# Patient Record
Sex: FEMALE | Race: WHITE
[De-identification: names, ages, dates, MRNs, and addresses within clinical notes are randomized per-mention and may not be internally consistent; named-entity substitution may affect disease eponyms.]

---

## 2018-05-20 ENCOUNTER — HOSPITAL ENCOUNTER (EMERGENCY)
Dept: HOSPITAL 25 - UCEAST | Age: 38
Discharge: HOME | End: 2018-05-20
Payer: COMMERCIAL

## 2018-05-20 VITALS — SYSTOLIC BLOOD PRESSURE: 106 MMHG | DIASTOLIC BLOOD PRESSURE: 63 MMHG

## 2018-05-20 DIAGNOSIS — H92.09: ICD-10-CM

## 2018-05-20 DIAGNOSIS — R68.83: ICD-10-CM

## 2018-05-20 DIAGNOSIS — J02.9: Primary | ICD-10-CM

## 2018-05-20 DIAGNOSIS — R06.02: ICD-10-CM

## 2018-05-20 DIAGNOSIS — F41.9: ICD-10-CM

## 2018-05-20 DIAGNOSIS — R01.1: ICD-10-CM

## 2018-05-20 DIAGNOSIS — F17.210: ICD-10-CM

## 2018-05-20 DIAGNOSIS — Z88.5: ICD-10-CM

## 2018-05-20 DIAGNOSIS — R11.0: ICD-10-CM

## 2018-05-20 PROCEDURE — G0463 HOSPITAL OUTPT CLINIC VISIT: HCPCS

## 2018-05-20 PROCEDURE — 87651 STREP A DNA AMP PROBE: CPT

## 2018-05-20 PROCEDURE — 86308 HETEROPHILE ANTIBODY SCREEN: CPT

## 2018-05-20 PROCEDURE — 99212 OFFICE O/P EST SF 10 MIN: CPT

## 2018-05-20 PROCEDURE — 36415 COLL VENOUS BLD VENIPUNCTURE: CPT

## 2018-05-20 NOTE — XMS REPORT
Valerie Gracia

 Created on:May 11, 2018



Patient:Valerie Gracia

Sex:Female

:1980

External Reference #:[id number]





Demographics







 Address  [address]



   [place] [address]

 

 Mobile Phone  [phone]

 

 Work Phone  [phone]

 

 Email Address  [email or web address]

 

 Preferred Language  English

 

 Marital Status  Not  Or 

 

 Muslim Affiliation  Unknown

 

 Race  White

 

 Ethnic Group  Not  Or 









Author







 Organization  [facility]

 

 Address  [address] [place] [address]

 

 Phone  [phone]









Care Team Providers







 Name  Role  Phone

 

 MeetRyan NP  Primary Care Physician  Unavailable









Payers







 Type  Date  Identification Numbers  Payment Provider  Subscriber

 

 Commercial  Effective:  Policy Number: [id number]  Leamalia Orellana RONALD [place]



   2012    Healthcare-Man  



       Med  









 Expires: 2016  Group Name: [employer]  [address]









 PayID: [id number]  [place] [address]









 Medigap Part B  Effective:  Policy Number:  Luis A BCFELIX CARDENAS



   10/01/2016  [id number]    [place]









 Expires: 2017  Group Name: Essential [employer] 1  [address] [address]









 PayID: [id number]  LEELEE Mcginnis [address]









 Medigap Part B  Effective:  Policy Number:  Medicaid/Computer  Valerie CARDENAS



   2017  [id number]  Science  [place]









 Expires: 2017  Group Name: 1 1  [address]









 PayID: [id number]  [place] [address]









 Commercial  Effective:  Policy Number:  [employer]  Valerie CARDENAS



   2017  [id number]  Med  [place]









 Expires: 2018  PayID: [id number]  [address]









 [place] [address]









 Commercial  Effective:  Policy Number:  [employer]  Valerie CARDENAS



   2018  [id number]  Medicaid  [place]









 PayID: [id number]  [address]









 [place] [address]







Advance Directives







 Type  Date  Description  Status  Comment

 

 Other Directive  2014  Health Care Proxy  Current and Verified  







Problems







 Date  Description  Provider  Status

 

 Onset: 01/15/2018  Chronic back pain  Ryan Dsouza, FNP-C  Active

 

 Onset: 2012  Alcohol dependence  Lissy Lr M.D.  Resolved









 Resolved: 01/15/2018









 Onset: 2012  Insomnia  Lissy Lr M.D.  Resolved









 Resolved: 01/15/2018









 Onset: 2012  Depressive disorder  Lissy Lr M.D.  Resolved









 Resolved: 01/15/2018







Family History







 Date  Family Member(s)  Problem(s)  Comments

 

   Father  Healthy  

 

   Mother  Depression  

 

   Mother  Irritable Bowel Syndrome  

 

   Mother  Cancer, Cervical  

 

   Maternal Grandfather  Alcoholism  







Social History







 Type  Date  Description  Comments

 

 Marital Status    Single  

 

 Home Environment    [place]  

 

 Work Status    Currently Working  [employer]

 

 Cigarette Use    Former Cigarette Smoker  

 

 ETOH Use    Denies alcohol use  

 

 Recreational Drug Use    Former Drug User  

 

 Smoking    Patient is a former smoker  

 

 Daily Caffeine    Does Not Consume Caffeine  

 

 Exercise Type/Frequency    Walks 4 times a week  







Allergies, Adverse Reactions, Alerts







 Date  Description  Reaction  Status  Severity  Comments

 

 2016  Morphine    active    dizziness

 

 2012  NKDA    inactive    







Medications







 Medication  Date  Status  Form  Strength  Qnty  SIG  Indications  Ordering



                 Provider

 

 Zonisamide    Active  Capsules  100mg  60cap  2 by  G89.4  wnti R.



           s  mouth    Storm,



             before    FNP-C



             bed    

 

 Trazodone HCL    Active  Tablets  100mg  60tab  2 by  G47.00  nti R.



           s  mouth    Storm,



             every    FNP-C



             night at    



             bedtime    



             for sleep    

 

 Oxycodone HCL    Active  Tablets  30mg  150ta  1 by  G89.4  nt R.



           bs  mouth    Storm,



             every 4    FNP-C



             hours for    



             pain, MDD    



             6    

 

 Lidocaine-Priloca    Active  Cream  2.5-2.5%  25gm  apply pea  K64.9  
Shawnti R.



 ine            sized    Storm,



             area 3 to    FNP-C



             4 times    



             daily for    



             pain    

 

 Proctozone-HC    Active  Cream  2.5%  30gm  apply to  K64.9  Shawnti R.



   /          hemorrhoi    Storm,



             ds 3 - 4    FNP-C



             times    



             daily if    



             needed    

 

 Oxycodone HCL ER    Active  Tab ER 12H  20mg  30tab  1 by    Ryan R.



       Abuse-Det    s  mouth    Storm,



             before    FNP-C



             bed for    



             chronic    



             pain    

 

 Prednisone  03/15  Active  Tablets  20mg  13tab  3 by  S46.811A  Elizabethi R        s  mouth    Storm,



             every day    FNP-C



             x 2days,    



             2 by    



             mouth    



             every day    



             x 2 days,    



             1 by    



             mouth    



             every day    



             x 2 days,    



             1/2 by    



             mouth    



             every day    



             x 2 days    

 

 Wellbutrin SR  03/15  Active  Tablets ER  150mg  60tab  take one  R53.83  
shalinii R    12HR    s  tablet by    Storm,



             mouth    FNP-C



             twice a    



             day    

 

 Nicotine    Active  Patches  14mg/24HR  28uni  1 patch    shalinii R    24HR    ts  every    Storm,



             day,    FNP-C



             remove    



             after 24    



             hours an    



             replace    



             with a    



             new    



             patch,    



             rotate    



             sites    

 

 Apri    Active  Tablets  0.15-30mg  168ta  1 by  Z30.9  Elizabethi R      -mcg  bs  mouth    Storm,



             every    FNP-C



             day, skip    



             placebo    



             pills and    



             start a    



             new pack    

 

 Gentamicin    Active  Solution  0.3%  5ml  2 drops  H00.015  Ryan RVishnu



 Sulfate  /2017          in    [facility],



             affected    FNP-C



             eye four    



             times a    



             day for    



             one week    

 

 Zofran Odt    Active  Tablets  4mg  30tab  dissolve  G89.4  Elizabethi R    Dispers    s  one tab    Storm,



             in mouth    FNP-C



             every 6-8    



             hours as    



             needed    



             nausea    

 

 Thiamine HCL    Active  Tablets  100mg  30tab  Take One  G90.09  Ryan 
R.



           s  Tablet By    Storm,



             Mouth    FNP-C



             Every Day    

 

 Docusate Sodium    Active  Capsules  100mg                      Ciro



                 FNP-C

 

 Folic Acid    Active  Tablets  1mg  90tab  1 by    Ryan ANDERSON.



           s  mouth    Storm,



             daily    FNP-C

 

 Multivitamins  25  Active  Capsules                        Ciro



                 FNP-C

 

 3ML Luer-Eladio Tip    Active  Misc  24G X 1"  30uni  Use as    Lissy



 Syringe 24G X 1"        3 ML  ts  directed    JOSEMANUEL Lr,



             once per    M.D.



             month to    



             inject    



             Vitamin    



             B12    

 

 Cyanocobalamin    Active  Solution  1000mcg/M  4unit  inject    Ryan MAYBERRY



         L  s  one    Storm,



             millilite    FNP-C



             rs (cc)    



             intramusc    



             ularly    



             once a    



             week as    



             directed    

 

 Tizanidine HCL    Active  Tablets  2mg    take 1    Unknown



   0000          tablet by    



             mouth    



             three    



             times a    



             day    

 

                 

 

 Oxycodone HCL ER  03/15  Hx  Tab ER 12H  40mg  60tab  1 by    Ryan R.



       Abuse-Det    s  mouth    Storm,



   -          twice    FNP-C



             daily for    



             chronic    



             pain    

 

 Oxycodone HCL    Hx  Tablets  30mg  150ta  1 by  GMickie.Ros Davis R.



           bs  mouth    Storm,



   -          every 4    FNP-C



             hours           needed    



             for    



             breakthro    



             ugh pain,    



             mdd 6    

 

 Oxycodone HCL ER    Hx  Tab ER 12H  30mg  60tab  one by    Ryan MAYBERRY



       Abuse-Det    s  mouth    Storm,



   -          twice    FNP-C



   03/15          daily for    



             chronic    



             pain    

 

 Oxycodone HCL    Hx  Tablets  20mg  30tab  1/2 to 1  G89.4  Elizabethi R.



           s  by mouth    Storm,



   -          every 6    FNP-C



             hours           needed    



             for    



             breakthro    



             ugh pain    

 

 Oxycodone HCL    Hx  Tablets  30mg  90tab  1 by  G89.4  Lcnti R.



           s  mouth    Storm,



   -          every 4    FNP-C



             hours for    



             chronic    



             pain    

 

 Trazodone HCL    Hx  Tablets  50mg  60tab  take 1 or  G47.00  Ryan R.



           s  2 tablet    Storm,



   -          by mouth    FNP-C



             at    



             bedtime    



             if needed    



             for sleep    

 

 Zonisamide    Hx  Capsules  100mg  30cap  1 by  G89.4  Elizabethi R.



           s  mouth    Storm,



   -          before    FNP-C



   05/11          bed    



   /2018              

 

 Oxycodone HCL    Hx  Tablets  20mg  90tab  1 by  G89.4  Lcntginny R.



           s  mouth    Storm,



   -          every 4    FNP-C



             hours for    



             pain    

 

 Nortriptyline HCL  01/15  Hx  Capsules  25mg  30cap  1 by  G90.09  Sergeiwnti R.



           s  mouth    Storm,



   -          every    FNP-C



             night at    



             bedtime    



             for    



             neuropath    



             ic pain    

 

 Clindamycin    Hx  Cream  2%  40gm  one  N76.0  Elizabethi R.



 Phosphate  /2017          applicato    Storm,



   -          r full    FNP-C



             into    



             vaginal    



             before    



             bed for    



             one week    

 

 Nicotine  10/20  Hx  Patches  14mg/24HR  21uni  apply one    Sergeiwnti R.



       24HR    ts  patch    Storm,



   -          daily for    FNP-C



             3 weeks    



             then    



             decrease    



             to 7mg    



             patch    

 

 Nicotine  10/20  Hx  Patches  7mg/24HR  28uni  apply one    Sergeiwnti R.



       24HR    ts  patch    Storm,



   -          daily    FNP-C



                 

 

 Oxycodone HCL    Hx  Tablets  30mg  180ta  1 by  G89.4  Elizabethi R.



           bs  mouth    Storm,



   -          every 4    FNP-C



             hours           needed    



             for    



             breakthro    



             ugh pain,    



             mdd 6    

 

 Oxycodone HCL ER    Hx  Tab ER 12H  40mg  60tab  1 by  G89.4  Lcnti R.



       Abuse-Det    s  mouth    Storm,



   -          twice    FNP-C



   01/15          daily for    



             chronic    



             pain    

 

 Oxycodone HCL    Hx  Tablets  20mg  126ta  2 by  G89.4  Lcnti R.



           bs  mouth    Storm,



   -          every 4    FNP-C



             hours           needed    



             for pain    

 

 Levaquin    Hx  Tablets  750mg  7tabs  1 by  J18.9  Sergeiwnti R.



             mouth    Storm,



   -          daily for    FNP-C



             7 days    



             for    



             pneumonia    

 

 Oxycodone HCL ER    Hx  Tab ER 12H  60mg  30tab  1 by  G89.4  Elizabethi R.



       Abuse-Det    s  mouth    Storm,



   -          every    FNP-C



   06/30          morning    



   /2017          for    



             chronic    



             pain    

 

 Fluconazole    Hx  Tablets  150mg  2tabs  1 by    Ryan R.



             mouth    Storm,



   -          now, may    FNP-C



             repeat in    



             1 week if    



             sx still    



             present    

 

 Doxycycline    Hx  Capsules  100mg  20cap  1 by    Ryan MAYBERRY



 Hyclate  /        s  mouth    Storm,



   -          twice a    FNP-C



             day for    



             10 days    

 

 Azithromycin    Hx  Tablets  250mg  6tabs  2 by  J18.9  Ryan MAYBERRY



             mouth    Storm,



   -          today    FNP-C



             then 1 by    



             mouth    



             daily for    



             4 days    

 

 Oxycodone HCL    Hx  Tablets  10mg  100ta  1 or 2 po    Ryan MAYBERRY



           bs  up to 6    Storm,



   -          times    FNP-C



             daily for    



             breakthro    



             ugh pain    

 

 Oxycodone HCL    Hx  Tablets  20mg  180ta  1 by  G89.4  Ryan MAYBERRY



           bs  mouth up    Storm,



   -          to 8    FNP-C



   06/30          times    



   /2017          daily if    



             needed    



             for pain    

 

 Oxycontin    Hx  Tab ER 12H  80mg  30tab  1 by  G90.09  Ryan MAYBERRY



       Abuse-Det    s  mouth    Storm,



   -          before    FNP-C



   06/30          bed    



   /2017              

 

 Oxycodone HCL    Hx  Tablets  10mg  90tab  1 or 2 by  G89.4  Ryan MAYBERRY



           s  mouth    Storm,



   -          q4hr as    FNP-C



             needed    



             breakthro    



             ugh pain    

 

 Oxycontin    Hx  Tab ER 12H  60mg  14tab  1 by  G90.09  Ryan MAYBERRY



       Abuse-Det    s  mouth    Storm,



   -          twice a    FNP-C



             day for    



   /2016          pain    

 

 Oxycodone HCL  11/10  Hx  Tablets  20mg  180ta  1 by    Ryan MAYBERRY



           bs  mouth up    Storm,



   -          to 6    FNP-C



   12/19          times    



   /2016          daily for    



             chronic    



             pain    

 

 Oxycodone HCL  10/31  Hx  Tablets  20mg  42tab  1 by    Ryan MAYBERRY



           s  mouth up    Storm,



   -          to 6    FNP-C



   11/07          times    



   /2016          daily if    



             needed    



             for pain    

 

 Oxycodone HCL  10/24  Hx  Tablets  20mg  42tab  1 by    Ryan MAYBERRY



           s  mouth up    Storm,



   -          to 6    FNP-C



   10/31          times    



   /2016          daily if    



             needed    



             for pain    

 

 Fluconazole  10/20  Hx  Tablets  150mg  2tabs  1 by    Ryan MAYBERRY



             mouth    Storm,



   -          now, may    FNP-C



             repeat in    



   /2017          1 week if    



             sx still    



             present    

 

 Oxycodone HCL  10/20  Hx  Tablets  20mg  24tab  1 by    Ryan R.



           s  mouth up    Storm,



   -          to 6    FNP-C



   10/24          times    



   /2016          daily if    



             needed    



             for pain    

 

 Oxycodone HCL ER  10/18  Hx  Tab ER 12H  20mg  28tab  1 by    Ryan R.



       Abuse-Det    s  mouth    Storm,



   -          twice a    FNP-C



   11/10          day for    



             chronic    



             pain    

 

 Oxycodone HCL  10/18  Hx  Tablets  5mg  30tab  1 or 2 by  G90.  Ryan R.



           s  mouth    Storm,



   -          three    FNP-C



   11/10          times a    



             day as    



             needed    



             breakthro    



             ugh pain    

 

 Azithromycin  10/18  Hx  Tablets  250mg  6tabs  2 by  J18.9  Ryan R.



             mouth    Storm,



   -          today    FNP-C



   10/28          then 1 by    



             mouth    



             daily for    



             4 days    

 

 Ondansetron    Hx  Tablets  4mg  12tab  dissolve    shalini R.



       Dispers    s  1 tablet    Storm,



   -          in mouth    FNP-C



   11/13          three    



   /2017          times a    



             day as    



             needed    



             for    



             nausea    

 

 Duloxetine HCL    Hx  Caps DR  60mg  30cap  1 by  G90.09  Aayush



   /2016    Part    s  mouth    Ennice



   -          daily for    III, FNP-C



             ic pain    

 

 Fluconazole    Hx  Tablets  150mg  2tabs  1 by    Ryan R.



             mouth    Storm,



   -          now, may    FNP-C



   10/20          repeat in    



   /2016          1 week if    



             sx still    



             present    

 

 Oxycodone HCL    Hx  Tablets  20mg  180ta  1 by  G90.09  Ryan R.



           bs  mouth up    Storm,



   -          to 6    FNP-C



   10/18          times    



   /2016          daily if    



             needed    



             for pain    

 

 Oxycodone HCL    Hx  Tablets  10mg  180ta  1 tablet  G90.  Lcnti R.



           bs  every 4    Storm,



   -          hours if    FNP-C



             needed    



             for pain    

 

 Oxycodone HCL    Hx  Tablets  10mg  180ta  1 tablet  G90.09  Shawnti R.



           bs  every 4    Storm,



   -          hours if    FNP-C



             needed    



             for pain    

 

 Morphine Sulfate    Hx  Tablets ER  30mg  30tab  1 po bid  G90.  
Shawnti R.



 ER          s  for    Storm,



   -          chronic    FNP-C



             pain    



                 

 

 Apri    Hx  Tablets  0.15-30mg  168ta  1 by  Z30.9  Sergeiwnti R.



         -mcg  bs  mouth    Storm,



   -          every day    FNP-C



                 

 

 Morphine Sulfate    Hx  Tablets ER  15mg  30tab  1 po bid    Shawnti R.



 ER          s  for    Storm,



   -          chronic    FNP-C



             pain    



                 

 

 Gabapentin    Hx  Tablets  600mg    2 po tid  G90.  Sergeiwnti R.



                 Storm,



   -              FNP-C



                 

 

 Oxycontin    Hx  Tab ER 12H  10mg  60tab  1 by  G90.  Sergeiwnti R.



       Abuse-Det    s  mouth    Storm,



   -          twice a    FNP-C



             day for    



             chronic    



             pain    

 

 Nortriptyline HCL    Hx  Capsules  50mg  180ca  2 by  G47.00  wnti R.



           ps  mouth    Storm,



   -          every    FNP-C



             night at    



             bedtime    



             for pain    

 

 Hydroxyzine HCL    Hx  Tablets  10mg  30tab  take one  F43.9  Shawnti R.



           s  tablet by    Storm,



   -          mouth    FNP-C



   10/18          three    



   /2016          times a    



             day as    



             needed    



             for    



             anxiety    

 

 Oxycodone HCL    Hx  Tablets  5mg  30tab  1 or 2 by  G90.  Shawnti R.



           s  mouth    Storm,



   -          every 6    FNP-C



             hours           needed    



             for    



             breakthro    



             ugh pain    

 

 Oxycodone HCL    Hx  Tablets  10mg  120ta  1 tablet  G90.wnti R.



           bs  every 6    Storm,



   -          hours for    FNP-C



             pain    



                 

 

 Gabapentin    Hx  Capsules  300mg  270ca  3 by  G90.  Shawnti R.



           ps  mouth    Storm,



   -          three    FNP-C



             times    



             daily for    



             nerve    



             pain    

 

 Nortriptyline HCL    Hx  Capsules  25mg  30cap  1 by  G47.00  Elizabeth R.



           s  mouth    Storm,



   -          every    FNP-C



             night at    



             bedtime    



             for    



             insomnia    



             and pain    

 

 Ergocalciferol    Hx  Capsules  02495Dxfe  8caps  1 by              mouth    Storm,



   -          twice    FNP-C



             weekly    



             for one    



             month    

 

 Oxycodone HCL    Hx  Tablets  5mg  120ta  1 by    nt        bs  mouth    Storm,



   -          every 4    FNP-C



             hours if    



             needed    



             for pain    

 

 Lyrica    Hx  Capsules  150mg  60cap  take one  G90.nt        s  capsule    Storm,



   -          by mouth    FNP-C



             twice a    



             day;    



             maximum    



             daily    



             dose=2    

 

 Gabapentin    Hx  Capsules  300mg  180ca  2 by  G90.09          ps  mouth    Storm,



   -          three    FNP-C



             times    



             daily for    



             nerve    



             pain    

 

 Oxycodone HCL    Hx  Tablets  5mg  60tab  1 by            s  mouth    Storm,



   -          every 4    FNP-C



             hours           needed    



             for pain    

 

 Oxycodone HCL    Hx  Tablets  10mg  30tab  1 tablets    Ryan RVishnu



           s  every 6    Storm,



   -          hours as    FNP-C



             needed    



             for pain    

 

 Quetiapine    Hx  Tablets  25mg    1/2 tab    Jessy



 Fumarate  /2015          po tid    Ciro,



   -          prn    FNP-C



                 

 

 Xifaxan    Hx  Tablets  550mg  60tab  1 by            s  mouth    Ciro,



   -          twice a    FNP-C



             day for           days    

 

 Senna Laxative    Hx  Tablets  8.6mg                      Ciro,



   -              FNP-C



                 

 

 Miralax    Hx  Packet  3350NF      K72.91                Ciro,



   -              FNP-C



                 

 

 Fluoxetine HCL    Hx  Capsules  10mg  30cap  1 by  308.3  Ryan R        s  mouth    Storm,



   -          every    FNP-C



   11/25          morning    



   /2015              

 

 Chlordiazepoxide    Hx  Capsules  10mg  120ca  1 by  303.90  [facility] R.



 HCL  /        ps  mouth    Storm,



   -          every 6    FNP-C



             hours           needed    



             for    



             anxiety    

 

 Clonidine HCL    Hx  Tablets  0.1mg  60tab  1 by  303.90  Shawnti R.



   /        s  mouth    Storm,



   -          three    FNP-C



             times a    



             day as    



             needed    



             for    



             anxiety    



             and    



             shakes    

 

 Lorazepam    Hx  Tablets  1mg  60six   or 1  303.90  Shawnti R.



   /        ty  by mouth    Meet,



   -          q6hr as    FNP-C



             needed    



                 

 

 Fluconazole    Hx  Tablets  200mg  2tabs  1 po now,  616.10  Shawnti R.



             repeat in    Storm,



   -          one week    FNP-C



                 

 

 Fluconazole    Hx  Tablets  150mg  2tabs  1 tablet              1 by    Ciro,



   -          mouth now    FNP-C



                 

 

 Ciprofloxacin HCL    Hx  Tablets  500mg  6tabs  take 1  599.0            tablet by    Ciro,



   -          mouth    FNP-C



             twice a    



             day x 3    



             days    

 

 Clonazepam    Hx  Tablets  1mg  20twe   or 1  303.90  Jessy



           nty  by mouth    Ciro,



   -          twice a    FNP-C



             day as    



   /2015          needed    



             anxiety    

 

 Citalopram    Hx  Tablets  20mg  90tab  1 by  303.90  [facility] RVishnu



 Hydrobromide  /        s  mouth    Storm,



   -          every day    FNP-C



             for    



             anxiety    

 

 Nystatin/Triamcin    Hx  Cream  086644-0.  60gm  apply to    Lissy



       1Unit/GM-    affected    JOSEMANUEL Lr,



   -      %    area bid    M.D.



                 

 

 Sprintec 28    Hx  Tablets  0.25-35mg  84tab  take one    Lissy



         -mcg  s  tablet by    JOSEMANUEL Lr,



   -          mouth    M.D.



             once    



             daily as    



             directed    

 

 Baclofen  10/31  Hx  Tablets  10mg  90tab  1 po tid  728.85  Lissy



           s      JOSEMANUEL Lr,



   -              M.D.



                 

 

 Diflucan  10/18  Hx  Tablets  150mg  1tabs  take 1    Lissy



             tablet    JOSEMANUEL Lr,



   -          now, may    M.D.



   10/23          repeat    



             dose in 1    



             week if    



             not    



             cleared    

 

 Dicyclomine HCL    Hx  Tablets  20mg  90tab  take one  787.91  Lissy



           s  tablet by    JOSEMANUEL Lr,



   -          mouth    M.D.



             every six    



   /2015          hours as    



             needed    

 

 Tizanidine HCL    Hx  Tablets  4mg  40tab  take one  724.5  Lissy



           s  po every    JOSEMANUEL Lr,



   -          6 hours    M.D.



   10/07          as needed    



             for pain    

 

 Vitamin B-12    Hx  Tablets Sub  1000mcg  60tab  1 po bid    Lissy



           s      JOSEMANUEL Lr,



   -              M.D.



                 

 

 Amitriptyline HCL    Hx  Tablets  25mg  30tab  take one  311  Lissy



           s  po at hs    JOSEMANUEL Lr,



   -              M.D.



                 

 

 Famotidine    Hx  Tablets  40mg  60tab  Take one  078.12  Lissy



           s  tablet po    JOSEMANUEL Lr,



   -          bid    M.D.



   10/07              



   /2013              

 

 Diflucan    Hx  Tablets  150mg  1tabs  take 1              tablet    Ciro,



   -          now, may    FNP-C



   10/07          repeat    



             dose in 1    



             week if    



             not    



             cleared    

 

 Mirtazapine  25  Hx  Tablets  15mg  30tab  take one  780.52  Lissy



           s  half to    JOSEMANUEL Lr,



   -          one    M.D.



             tablet po    



   /          at hs    

 

 Nasonex    Hx  Suspension  50mcg/Act  17gm  1-2  477.9  Lissy



             sprays    JOSEMANUEL Lr,



   -          intranasa    M.DVishnu



             l    



                 

 

 Mirtazapine  11  Hx  Tablets  15mg  60tab  take one  780.52  Lissy



       Dispers    s  or two    JOSEMANUEL Lr,



   -          tablets    M.D.



             po at hs    



                 

 

 Clonidine HCL    Hx  Tablets  0.1mg  60tab  1 po bid  303.90  Lissy



           s  or tid    Divya Arnett M.D.



   10/07          anxiety    



             and    



             depressio    



             n    

 

 Naltrexone HCL    Hx  Tablets  50mg  30tab  1 po qd  303.90          Divya Rowe M.D.



   10/07              



   /2013              







Medications Administered in Office







 Medication  Date  Status  Form  Strength  Qnty  SIG  Indications  Ordering



                 Provider

 

 Vitamin B-12    Administered  Injection          Shawnti R.



 Injection-To  018              Storm,



 1000mcg                FNP-C

 

 Vitamin B-12    Administered  Injection          Shawnti R.



 Injection-To  017              Storm,



 1000mcg                FNP-C

 

 Vitamin B-12    Administered  Injection          Shawnti R.



 Injection-To  017              Storm,



 1000mcg                FNP-C

 

 Vitamin B-12    Administered  Injection          Shawnti R.



 Injection-To  015              Storm,



 1000mcg                FNP-C

 

 Vitamin B-12    Administered  Injection          Shawnti R.



 Injection-To  014              Storm,



 1000mcg                FNP-C

 

 Vitamin B-12    Administered  Injection          Lab and



 Injection-To  013              Office



 1000mcg                Services







Immunizations







 CPT Code  Status  Date  Vaccine  Lot #

 

 66748  Given  2017  Tdap (Adacel)  [id number]

 

 93116  Given  2013  Influenza Vaccine-Preservative Free 3 Yrs And  



       Above  









 









 14020  Refused  2018  Influenza Virus Vaccine, Quadrivalent, 3 Yr &gt; 
Quad,  



       Preserv Free  

 

 32601  Refused  01/15/2018  Menb-Serogroup B Meningitis  

 

 69670  Refused  2017  Influenza Virus Vaccine, Quadrivalent, 3 Yr &gt; 
Quad,  



       Preserv Free  







Vital Signs







 Date  Vital  Result  Comment

 

 2018  Weight  126.00 lb  









 Weight in kg's  57.154  

 

 BP Systolic  100 mmHg  

 

 BP Diastolic  60 mmHg  

 

 Heart Rate  78 /min  

 

 Body Temperature  98.3 F  

 

 Respiratory Rate  14 /min  









 2018  Weight  131.00 lb  









 Weight in kg's  59.422  

 

 BP Systolic  110 mmHg  

 

 BP Diastolic  64 mmHg  

 

 Heart Rate  78 /min  

 

 Body Temperature  97.0 F  









 03/15/2018  Weight  132.00 lb  









 Weight in kg's  59.875  

 

 BP Systolic  98 mmHg  

 

 BP Diastolic  70 mmHg  

 

 Heart Rate  79 /min  

 

 Body Temperature  98.9 F  

 

 Respiratory Rate  18 /min  

 

 O2 % BldC Oximetry  99 %  









 2018  Weight  132.00 lb  









 Weight in kg's  59.875  

 

 BP Systolic  120 mmHg  

 

 BP Diastolic  70 mmHg  

 

 Heart Rate  76 /min  

 

 Body Temperature  97.9 F  









 2018  Weight  134.00 lb  









 Weight in kg's  60.782  

 

 BP Systolic  110 mmHg  

 

 BP Diastolic  62 mmHg  

 

 Heart Rate  88 /min  

 

 Body Temperature  97.6 F  

 

 Respiratory Rate  20 /min  









 2018  Weight  135.00 lb  









 Weight in kg's  61.236  

 

 BP Systolic  110 mmHg  

 

 BP Diastolic  68 mmHg  

 

 Heart Rate  80 /min  

 

 Body Temperature  98.1 F  

 

 Respiratory Rate  16 /min  

 

 O2 % BldC Oximetry  100 %  









 01/15/2018  Weight  135.00 lb  









 Weight in kg's  61.236  

 

 BP Systolic  120 mmHg  

 

 BP Diastolic  70 mmHg  

 

 Heart Rate  80 /min  

 

 Body Temperature  98.2 F  









 2017  Weight  135.00 lb  









 Weight in kg's  61.236  

 

 BP Systolic  104 mmHg  

 

 BP Diastolic  74 mmHg  

 

 Heart Rate  103 /min  

 

 Body Temperature  97.0 F  

 

 Respiratory Rate  16 /min  

 

 O2 % BldC Oximetry  98 %  









 2017  Weight  137.00 lb  









 Weight in kg's  62.143  

 

 BP Systolic  90 mmHg  

 

 BP Diastolic  60 mmHg  

 

 Heart Rate  82 /min  

 

 Body Temperature  98.4 F  

 

 Respiratory Rate  16 /min  

 

 Height  67.5 inches  5'7.50"

 

 BMI (Body Mass Index)  21.1 kg/m2  

 

 O2 % BldC Oximetry  98 %  









 2017  Weight  135.00 lb  









 Weight in kg's  61.236  

 

 BP Systolic  112 mmHg  

 

 BP Diastolic  70 mmHg  

 

 Heart Rate  72 /min  

 

 Respiratory Rate  14 /min  

 

 O2 % BldC Oximetry  98 %  









 10/20/2017  Weight  134.00 lb  









 Weight in kg's  60.782  

 

 BP Systolic  120 mmHg  

 

 BP Diastolic  80 mmHg  

 

 Heart Rate  84 /min  

 

 Body Temperature  98.4 F  

 

 Respiratory Rate  14 /min  









 09/15/2017  Weight  131.00 lb  









 Weight in kg's  59.422  

 

 BP Systolic  130 mmHg  

 

 BP Diastolic  70 mmHg  

 

 Heart Rate  96 /min  

 

 Body Temperature  97.8 F  

 

 Respiratory Rate  14 /min  









 2017  Weight  126.00 lb  









 Weight in kg's  57.154  

 

 BP Systolic  110 mmHg  

 

 BP Diastolic  70 mmHg  

 

 Heart Rate  100 /min  

 

 Body Temperature  97.9 F  

 

 Respiratory Rate  14 /min  









 2017  Weight  131.00 lb  









 Weight in kg's  59.422  

 

 BP Systolic  142 mmHg  

 

 BP Diastolic  86 mmHg  

 

 Heart Rate  84 /min  

 

 Body Temperature  98.9 F  

 

 Respiratory Rate  16 /min  

 

 O2 % BldC Oximetry  99 %  









 2017  Weight  128.00 lb  









 Weight in kg's  58.061  

 

 BP Systolic  120 mmHg  

 

 BP Diastolic  80 mmHg  

 

 Heart Rate  80 /min  

 

 Body Temperature  98.8 F  

 

 Respiratory Rate  12 /min  









 2017  Weight  128.00 lb  









 Weight in kg's  58.061  

 

 BP Systolic  124 mmHg  

 

 BP Diastolic  72 mmHg  

 

 Heart Rate  80 /min  

 

 Body Temperature  99.4 F  

 

 Respiratory Rate  20 /min  









 2017  BP Systolic  140 mmHg  









 BP Diastolic  98 mmHg  

 

 Heart Rate  100 /min  

 

 Body Temperature  98.1 F  

 

 O2 % BldC Oximetry  98 %  









 2017  Weight  129.00 lb  









 Weight in kg's  58.514  

 

 BP Systolic  124 mmHg  

 

 BP Diastolic  66 mmHg  

 

 Heart Rate  79 /min  

 

 Body Temperature  9.9 F  

 

 Respiratory Rate  16 /min  

 

 Last Menstrual Period  5191189  

 

 O2 % BldC Oximetry  98 %  









 2017  Weight  132.00 lb  









 Weight in kg's  59.875  

 

 BP Systolic  100 mmHg  

 

 BP Diastolic  60 mmHg  

 

 Heart Rate  72 /min  

 

 Body Temperature  98.3 F  

 

 Respiratory Rate  12 /min  

 

 Last Menstrual Period  [id number]  









 2016  Weight  131.00 lb  









 Weight in kg's  59.422  

 

 BP Systolic  100 mmHg  

 

 BP Diastolic  70 mmHg  

 

 Heart Rate  80 /min  

 

 Body Temperature  97.5 F  

 

 Respiratory Rate  12 /min  









 2016  Weight  131.00 lb  









 Weight in kg's  59.422  

 

 BP Systolic  108 mmHg  

 

 BP Diastolic  70 mmHg  

 

 Heart Rate  94 /min  

 

 Body Temperature  96.9 F  

 

 Respiratory Rate  16 /min  

 

 O2 % BldC Oximetry  99 %  









 2016  Weight  129.00 lb  









 Weight in kg's  58.514  

 

 BP Systolic  128 mmHg  

 

 BP Diastolic  66 mmHg  

 

 Body Temperature  97.0 F  

 

 Respiratory Rate  16 /min  









 10/18/2016  Weight  128.00 lb  









 Weight in kg's  58.061  

 

 BP Systolic  114 mmHg  

 

 BP Diastolic  64 mmHg  

 

 Heart Rate  80 /min  

 

 Body Temperature  97.1 F  

 

 Respiratory Rate  17 /min  

 

 O2 % BldC Oximetry  99 %  









 2016  Weight  126.00 lb  









 Weight in kg's  57.154  

 

 BP Systolic  120 mmHg  

 

 BP Diastolic  84 mmHg  

 

 Heart Rate  88 /min  









 2016  Weight  131.00 lb  









 Weight in kg's  59.422  

 

 BP Systolic  130 mmHg  

 

 BP Diastolic  80 mmHg  

 

 Heart Rate  103 /min  

 

 Body Temperature  98.8 F  

 

 Respiratory Rate  16 /min  









 2016  Weight  132.00 lb  









 Weight in kg's  59.875  

 

 BP Systolic  100 mmHg  

 

 BP Diastolic  70 mmHg  

 

 Heart Rate  97 /min  

 

 Body Temperature  98.6 F  

 

 Respiratory Rate  20 /min  









 2016  Weight  135.00 lb  









 Weight in kg's  61.236  

 

 BP Systolic  110 mmHg  

 

 BP Diastolic  70 mmHg  

 

 Heart Rate  80 /min  









 2016  Weight  126.00 lb  









 Weight in kg's  57.154  

 

 BP Systolic  103 mmHg  

 

 BP Diastolic  70 mmHg  

 

 Heart Rate  88 /min  









 2016  Weight  129.00 lb  









 Weight in kg's  58.514  

 

 BP Systolic  106 mmHg  

 

 BP Diastolic  72 mmHg  

 

 Heart Rate  103 /min  

 

 Body Temperature  97.8 F  

 

 O2 % BldC Oximetry  99 %  









 2016  Weight  125.00 lb  









 Weight in kg's  56.700  

 

 BP Systolic  100 mmHg  

 

 BP Diastolic  70 mmHg  

 

 Heart Rate  89 /min  









 2016  Weight  125.00 lb  









 Weight in kg's  56.700  

 

 BP Systolic  88 mmHg  

 

 BP Diastolic  60 mmHg  

 

 Heart Rate  92 /min  









 2016  Weight  128.00 lb  









 Weight in kg's  58.061  

 

 BP Systolic  97 mmHg  

 

 BP Diastolic  54 mmHg  

 

 Heart Rate  68 /min  









 2016  Weight  124.00 lb  









 Weight in kg's  56.246  

 

 BP Systolic  100 mmHg  

 

 BP Diastolic  60 mmHg  

 

 Heart Rate  75 /min  

 

 Body Temperature  99.1 F  

 

 O2 % BldC Oximetry  92 %  









 2016  Weight  126.00 lb  









 Weight in kg's  57.154  

 

 BP Systolic  118 mmHg  

 

 BP Diastolic  60 mmHg  

 

 Heart Rate  77 /min  

 

 Body Temperature  98.8 F  

 

 Height  67.5 inches  5'7.50"

 

 BMI (Body Mass Index)  19.4 kg/m2  

 

 O2 % BldC Oximetry  98 %  









 2016  Weight  127.00 lb  









 Weight in kg's  57.607  

 

 BP Systolic  94 mmHg  

 

 BP Diastolic  62 mmHg  

 

 Heart Rate  72 /min  

 

 Body Temperature  97.9 F  









 2015  Weight  128.00 lb  









 Weight in kg's  58.061  

 

 BP Systolic  110 mmHg  

 

 BP Diastolic  64 mmHg  

 

 Heart Rate  90 /min  









 2015  Weight  128.75 lb  









 Weight in kg's  58.401  

 

 BP Systolic  102 mmHg  

 

 BP Diastolic  68 mmHg  

 

 Heart Rate  92 /min  

 

 Body Temperature  97.9 F  









 2015  Weight  132.00 lb  









 Weight in kg's  59.875  

 

 BP Systolic  120 mmHg  

 

 BP Diastolic  80 mmHg  

 

 Heart Rate  72 /min  









 2015  Weight  139.00 lb  









 Weight in kg's  63.050  

 

 BP Systolic  114 mmHg  

 

 BP Diastolic  76 mmHg  

 

 Heart Rate  92 /min  









 2015  Weight  140.00 lb  









 Weight in kg's  63.504  

 

 BP Systolic  118 mmHg  

 

 BP Diastolic  78 mmHg  

 

 Heart Rate  80 /min  









 2014  Weight  140.00 lb  









 Weight in kg's  63.504  

 

 BP Systolic  130 mmHg  

 

 BP Diastolic  82 mmHg  

 

 Heart Rate  96 /min  

 

 Body Temperature  97.5 F  









 2014  Weight  147.00 lb  









 Weight in kg's  66.679  

 

 BP Systolic  114 mmHg  

 

 BP Diastolic  70 mmHg  

 

 Heart Rate  96 /min  

 

 Body Temperature  98.7 F  









 2013  Weight  158.00 lb  









 Weight in kg's  71.669  

 

 BP Systolic  118 mmHg  

 

 BP Diastolic  70 mmHg  

 

 Heart Rate  88 /min  

 

 Height  68 inches  5'8"

 

 BMI (Body Mass Index)  24.0 kg/m2  

 

 Last Menstrual Period  [id number]  Oral Contraceptive









 10/31/2013  Weight  148.00 lb  









 Weight in kg's  67.133  

 

 BP Systolic  110 mmHg  

 

 BP Diastolic  74 mmHg  

 

 Heart Rate  88 /min  

 

 Body Temperature  97.0 F  

 

 O2 % BldC Oximetry  98 %  









 10/07/2013  Weight  148.00 lb  









 Weight in kg's  67.133  

 

 BP Systolic  120 mmHg  

 

 BP Diastolic  78 mmHg  

 

 Heart Rate  84 /min  

 

 Body Temperature  98.4 F  

 

 Height  67.5 inches  5'7.50"

 

 BMI (Body Mass Index)  22.8 kg/m2  









 2013  Weight  147.00 lb  









 Weight in kg's  66.679  

 

 BP Systolic  108 mmHg  

 

 BP Diastolic  64 mmHg  

 

 Heart Rate  92 /min  

 

 Body Temperature  98.6 F  









 2013  Weight  135.00 lb  









 Weight in kg's  61.236  

 

 BP Systolic  134 mmHg  

 

 BP Diastolic  76 mmHg  

 

 Heart Rate  91 /min  

 

 Body Temperature  98.9 F  

 

 Height  67.5 inches  5'7.50"

 

 BMI (Body Mass Index)  20.8 kg/m2  

 

 O2 % BldC Oximetry  99 %  









 2012  Weight  161.00 lb  









 Weight in kg's  73.030  

 

 BP Systolic  104 mmHg  

 

 BP Diastolic  70 mmHg  

 

 Heart Rate  76 /min  









 2012  Weight  160.00 lb  









 Weight in kg's  72.576  

 

 BP Systolic  100 mmHg  

 

 BP Diastolic  64 mmHg  

 

 Heart Rate  68 /min  









 2012  Weight  150.00 lb  









 Weight in kg's  68.040  

 

 BP Systolic  100 mmHg  

 

 BP Diastolic  68 mmHg  

 

 Heart Rate  76 /min  









 2012  Weight  150.00 lb  









 Weight in kg's  68.040  

 

 BP Systolic  134 mmHg  

 

 BP Diastolic  80 mmHg  

 

 Heart Rate  72 /min  

 

 Height  68.5 inches  5'8.50"

 

 BMI (Body Mass Index)  22.5 kg/m2  







Results







 Test  Date  Test  Result  H/L  Range  Note

 

 Laboratory test finding  2017  Strep Screen  neg    Neg  

 

 Flu Test A, B, Or A &amp;  2017  Influenza A Antigen  neg      



 B,Binaxn            









 Influenza B Antigen  neg      









 Laboratory test  2017  Gardnerella/Yeast: Vaginal  SEE RESULT BELOW      
1



 finding    Dna        

 

 GC/Chlamydia  2017  Chlamydia trachomatis Rna  Negative    Negative  



 Amplified Rna            









 Neisseria gonorrhoeae (GC) Rna  Negative    Negative  









 Laboratory test finding  2017  Trichomonas Vaginalis Rna  Negative    
Negative  2

 

 Urine DIP  2017  Leukocytes  NEG    Neg  









 Urine Nitrites  NEG    Neg  

 

 Urobilinogen  NORM    Norm  

 

 Total Protein, Urine  NEG    Neg  

 

 Urine pH  6    5-6  

 

 Urine Blood  NEG    Neg  

 

 Specific Gravity  1.020    1.01-1.02  

 

 Urine Ketones  NEG    Neg  

 

 Urine Bilirubin  NEG    Neg  

 

 Urine Glucose  NORM    Norm  









 Urine Drug Abuse 20  09/15/2017  Misc  &lt;pending&gt;      

 

 Laboratory test finding  2017  Vitamin D Total  48.4 ng/mL    30-50  3



     25(Oh)        

 

 Lyme Western Blot  2017  Lyme Disease IgG Ab  Negative    Negative  



     WB        









 Lyme Disease IgG Bands Present  No bands detecte &lt;SEE NOTE&gt; kDa      4

 

 Lyme Disease IgM Ab WB  Negative    Negative  

 

 Lyme Disease IgM Bands Present  No bands detecte &lt;SEE NOTE&gt; kDa      5

 

 Lyme Disease Interpretation  See Comment      6









 Drug Abuse 20 Urine  2017  Urine Amphetamine  Negative ng/mL      7









 Urine Barbiturates  Negative ng/mL      8

 

 Urine Benzodiazepines  Negative ng/mL      9

 

 Urine Cocaine  Negative ng/mL      10

 

 Urine Phencyclidine  Negative ng/mL    Cutoff: 25  

 

 Urine Tetrahydrocannabinol  Negative ng/mL    Cutoff: 50  11

 

 Creatinine  85.0 mg/dL      

 

 Specific Gravity  1.013      

 

 pH  6.7      

 

 Oxidants  Negative      12

 

 Adulterants Comment  Normal      

 

 Codeine, Ur  Not Detected ng/mL    Cutoff: 25  13

 

 Codeine-6-beta-glucuronide, Ur  Not Detected ng/mL      14

 

 Morphine, Ur  Not Detected ng/mL    Cutoff: 25  15

 

 Morphine-6-beta-glucuronide, U  Not Detected ng/mL      16

 

 6-monoacetylmorphine, Ur  Not Detected ng/mL    Cutoff: 25  17

 

 Hydrocodone, Ur  Not Detected ng/mL    Cutoff: 25  18

 

 Norhydrocodone, Ur  Not Detected ng/mL    Cutoff: 25  19

 

 Dihydrocodeine, Ur  Not Detected ng/mL    Cutoff: 25  20

 

 Hydromorphone, Ur  Not Detected ng/mL    Cutoff: 25  21

 

 [id number]  Not Detected ng/mL      22

 

 Oxycodone, Ur  Present ng/mL    Cutoff: 25  23

 

 Noroxycodone, Ur  Present ng/mL    Cutoff: 25  24

 

 Oxymorphone, Ur  Present ng/mL    Cutoff: 25  25

 

 Oxymorphone-3-beta-glucuronide  Present ng/mL      26

 

 Noroxymorphone, Ur  Present ng/mL    Cutoff: 25  27

 

 Fentanyl, Ur  Not Detected ng/mL    Cutoff: 2  28

 

 Norfentanyl, Ur  Not Detected ng/mL    Cutoff: 2  29

 

 Meperidine, Ur  Not Detected ng/mL    Cutoff: 25  30

 

 Normeperidine, Ur  Not Detected ng/mL    Cutoff: 25  31

 

 Naloxone, Ur  Not Detected ng/mL    Cutoff: 25  32

 

 Naloxone-3-beta-glucuronide, U  Not Detected ng/mL      33

 

 Methadone, Ur  Not Detected ng/mL    Cutoff: 25  34

 

 Eddp, Ur  Not Detected ng/mL    Cutoff: 25  35

 

 Propoxyphene, Ur  Not Detected ng/mL    Cutoff: 25  36

 

 Norpropoxyphene, Ur  Not Detected ng/mL    Cutoff: 25  37

 

 Tramadol, Ur  Not Detected ng/mL    Cutoff: 25  38

 

 O-desmethyltramadol, Ur  Not Detected ng/mL    Cutoff: 25  39

 

 Tapentadol, Ur  Not Detected ng/mL    Cutoff: 25  40

 

 N-desmethyltapentadol, Ur  Not Detected ng/mL    Cutoff: 50  41

 

 Tapentadol-beta-glucuronide, U  Not Detected ng/mL      42

 

 Buprenorphine, Ur  Not Detected ng/mL    Cutoff: 5  43

 

 Norbuprenorphine, Ur  Not Detected ng/mL    Cutoff: 5  44

 

 Norbuprenorphine glucuronide  Not Detected ng/mL    Cutoff: 20  45

 

 Opioid Interpretation  See Comment      46









 GC/Chlamydia Amplified Rna  2017  Chlamydia trachomatis Rna  Negative    
Negative  









 Neisseria gonorrhoeae (GC) Rna  Negative    Negative  









 Laboratory test  2017  Gardnerella/Yeast: Vaginal  SEE RESULT BELOW      
47



 finding    Dna        









 Trichomonas Vaginalis Rna  Negative    Negative  48









 Urine DIP  2017  Leukocytes  neg    Neg  









 Urine Nitrites  neg    Neg  

 

 Urobilinogen  norm    Norm  

 

 Total Protein, Urine  trace    Neg  

 

 Urine pH  8  High  5-6  

 

 Urine Blood  neg    Neg  

 

 Specific Gravity  1.010    1.01-1.02  

 

 Urine Ketones  neg    Neg  

 

 Urine Bilirubin  neg    Neg  

 

 Urine Glucose  norm    Norm  









 Laboratory test finding  2017  HCG DIP Test  neg    Neg  

 

 CBC Auto Diff  2016  White Blood Count  6.6 10^3/uL    3.5-10.8  









 Red Blood Count  4.02 10^6/uL    4.0-5.4  

 

 Hemoglobin  12.1 g/dL    12.0-16.0  

 

 Hematocrit  37 %    35-47  

 

 Mean Corpuscular Volume  91 fL    80-97  

 

 Mean Corpuscular Hemoglobin  30 pg    27-31  

 

 Mean Corpuscular HGB Conc  33 g/dL    31-36  

 

 Red Cell Distribution Width  14 %    10.5-15  

 

 Platelet Count  226 10^3/uL    150-450  

 

 Mean Platelet Volume  9 um3    7.4-10.4  

 

 Abs Neutrophils  3.8 10^3/uL    1.5-7.7  

 

 Abs Lymphocytes  2.1 10^3/uL    1.0-4.8  

 

 Abs Monocytes  0.6 10^3/uL    0-0.8  

 

 Abs Eosinophils  0.1 10^3/uL    0-0.6  

 

 Abs Basophils  0 10^3/uL    0-0.2  

 

 Abs Nucleated RBC  0.01 10^3/uL      

 

 Granulocyte %  57.9 %    38-83  

 

 Lymphocyte %  31.9 %    25-47  

 

 Monocyte %  8.6 %    1-9  

 

 Eosinophil %  1.0 %    0-6  

 

 Basophil %  0.6 %    0-2  

 

 Nucleated Red Blood Cells %  0.1      









 Comp Metabolic Panel  2016  Sodium  134 mmol/L    133-145  









 Potassium  3.6 mmol/L    3.5-5.0  

 

 Chloride  101 mmol/L    101-111  

 

 Co2 Carbon Dioxide  28 mmol/L    22-32  

 

 Anion Gap  5 mmol/L    2-11  

 

 Glucose  106 mg/dL  High    

 

 Blood Urea Nitrogen  11 mg/dL    6-24  

 

 Creatinine  0.80 mg/dL    0.51-0.95  

 

 BUN/Creatinine Ratio  13.8    8-20  

 

 Calcium  9.2 mg/dL    8.6-10.3  

 

 Total Protein  6.7 g/dL    6.4-8.9  

 

 Albumin  4.0 g/dL    3.2-5.2  

 

 Globulin  2.7 g/dL    2-4  

 

 Albumin/Globulin Ratio  1.5    1-3  

 

 Total Bilirubin  0.60 mg/dL    0.2-1.0  

 

 Alkaline Phosphatase  51 U/L      

 

 Alt  17 U/L    7-52  

 

 Ast  22 U/L    13-39  

 

 Egfr Non-  82.1    &gt;60  

 

 Egfr   105.6    &gt;60  49









 Laboratory test finding  2016  Lipase  11 U/L    11.0-82.0  









 C Reactive Protein  5.22 mg/L  High  &lt; 5.00  50

 

 HCG Pregnancy  &lt; 0.60 mIU/mL      51









 Laboratory test  2016  Urine Culture  SEE RESULT BELOW      52, 53



 finding            

 

 Laboratory test  2016  HCG DIP Test  NEG    Neg  



 finding            

 

 Laboratory test  2016  Urine Culture And  SEE RESULT BELOW      54



 finding    Sensitivities        

 

 Urine DIP  2016  Leukocytes  NEG    Neg  









 Urine Nitrites  NEG    Neg  

 

 Urobilinogen  NORM    Norm  

 

 Total Protein, Urine  NEG    Neg  

 

 Urine pH  5    5-6  

 

 Urine Blood  NEG    Neg  

 

 Specific Gravity  1.02    1.01-1.02  

 

 Urine Ketones  NEG    Neg  

 

 Urine Bilirubin  NEG    Neg  

 

 Urine Glucose  NORM    Norm  









 Laboratory test finding  2016  Vitamin D, 1,25 Dihydroxy  8.7 pg/mL    18
-78  55









 Vitamin B12  839 pg/mL    180-914  56









 Arthritis Panel  2016  Uric Acid  4.8 mg/dL    2.3-6.6  









 Rheumatoid Factor  &lt;15 IU/mL    &lt;15  57

 

 Kait (Anti-Nuclear AB) Screen  Negative    Negative  









 Laboratory test finding  2016  C Reactive Protein  3.47 mg/L    &lt; 
5.00  58









 Cyclic Citrullinated Pep Igg  &lt;15.6 U      59









 CBC Auto Diff  2016  White Blood Count  5.6 10^3/uL    3.5-10.8  









 Red Blood Count  4.09 10^6/uL    4.0-5.4  

 

 Hemoglobin  12.5 g/dL    12.0-16.0  

 

 Hematocrit  37 %    35-47  

 

 Mean Corpuscular Volume  91 fL    80-97  

 

 Mean Corpuscular Hemoglobin  31 pg    27-31  

 

 Mean Corpuscular HGB Conc  34 g/dL    31-36  

 

 Red Cell Distribution Width  13 %    10.5-15  

 

 Platelet Count  240 10^3/uL    150-450  

 

 Mean Platelet Volume  9 um3    7.4-10.4  

 

 Abs Neutrophils  2.9 10^3/uL    1.5-7.7  

 

 Abs Lymphocytes  2.1 10^3/uL    1.0-4.8  

 

 Abs Monocytes  0.5 10^3/uL    0-0.8  

 

 Abs Eosinophils  0.1 10^3/uL    0-0.6  

 

 Abs Basophils  0 10^3/uL    0-0.2  

 

 Abs Nucleated RBC  0 10^3/uL      

 

 Granulocyte %  51.7 %    38-83  

 

 Lymphocyte %  38.2 %    25-47  

 

 Monocyte %  8.5 %    1-9  

 

 Eosinophil %  1.0 %    0-6  

 

 Basophil %  0.6 %    0-2  

 

 Nucleated Red Blood Cells %  0.1      









 Lyme Western Blot  2016  Lyme Disease IgG Ab WB  Negative    Negative  









 Lyme Disease IgG Bands Present  No bands detecte &lt;SEE NOTE&gt; kDa      60

 

 Lyme Disease IgM Ab WB  Negative    Negative  

 

 Lyme Disease IgM Bands Present  No bands detecte &lt;SEE NOTE&gt; kDa      61

 

 Lyme Disease Interpretation  See Comment      62









 Laboratory test finding  2016  Erythrocyte Sed Rate  17 mm/Hr  High  0-
14  









 TSH (Thyroid Stim Horm)  1.68 ?IU/mL    0.34-5.60  









 Laboratory test finding  02/15/2016  Alcohol  &lt; 10 mg/dL    &lt;10  

 

 Drug Abuse 20 Urine  02/15/2016  Urine Amphetamine  Negative ng/mL      63









 Urine Barbiturates  Negative ng/mL      64

 

 Urine Benzodiazepines  Negative ng/mL      65

 

 Urine Cocaine  Negative ng/mL      66

 

 Urine Methadone  Negative ng/mL      67

 

 Urine Opiates  Negative ng/mL      68

 

 Urine Phencyclidine  Negative ng/mL    Cutoff: 25  

 

 Urine Tetrahydrocannabinol  Negative ng/mL    Cutoff: 20  69

 

 Urine Oxycodone  Presumptive Posi &lt;SEE NOTE&gt; ng/mL      70









 Oxycodone, Urine Quantitation  02/15/2016  Oxycodone  2890 ng/mL      71









 Oxymorphone  622 ng/mL      72

 

 Oxycodone Interpretation  Positive.      73









 Laboratory test finding  2016  Cytology  SEE RESULT BELOW      74









 HPV Rna Ww/Reflex Genotype  POSITIVE    Negative  75









 HPV 16, 18/45 Genotype  2016  HPV 16 Genotype  Negative    Negative  









 HPV 18/45 Genotype  Negative    Negative  









 CBC Auto Diff  2016  White Blood Count  5.7 10^3/uL    3.5-10.8  









 Red Blood Count  4.16 10^6/uL    4.0-5.4  

 

 Hemoglobin  13.0 g/dL    12.0-16.0  

 

 Hematocrit  39 %    35-47  

 

 Mean Corpuscular Volume  93 fL    80-97  

 

 Mean Corpuscular Hemoglobin  31 pg    27-31  

 

 Mean Corpuscular HGB Conc  34 g/dL    31-36  

 

 Red Cell Distribution Width  13 %    10.5-15  

 

 Platelet Count  230 10^3/uL    150-450  

 

 Mean Platelet Volume  10 um3    7.4-10.4  

 

 Abs Neutrophils  2.7 10^3/uL    1.5-7.7  

 

 Abs Lymphocytes  2.4 10^3/uL    1.0-4.8  

 

 Abs Monocytes  0.6 10^3/uL    0-0.8  

 

 Abs Eosinophils  0.1 10^3/uL    0-0.6  

 

 Abs Basophils  0 10^3/uL    0-0.2  

 

 Abs Nucleated RBC  0 10^3/uL      

 

 Granulocyte %  47.1 %    38-83  

 

 Lymphocyte %  41.5 %    25-47  

 

 Monocyte %  9.6 %  High  1-9  

 

 Eosinophil %  1.3 %    0-6  

 

 Basophil %  0.5 %    0-2  

 

 Nucleated Red Blood Cells %  0      









 Comp Metabolic Panel  2016  Sodium  140 mmol/L    133-145  









 Potassium  4.0 mmol/L    3.5-5.0  

 

 Chloride  103 mmol/L    101-111  

 

 Co2 Carbon Dioxide  32 mmol/L    22-32  

 

 Anion Gap  5 mmol/L    2-11  

 

 Glucose  76 mg/dL      

 

 Blood Urea Nitrogen  8 mg/dL    6-24  

 

 Creatinine  0.81 mg/dL    0.51-0.95  

 

 BUN/Creatinine Ratio  9.9    8-20  

 

 Calcium  9.3 mg/dL    8.6-10.3  

 

 Total Protein  6.9 g/dL    6.4-8.9  

 

 Albumin  4.2 g/dL    3.2-5.2  

 

 Globulin  2.7 g/dL    2-4  

 

 Albumin/Globulin Ratio  1.6    1-3  

 

 Total Bilirubin  0.30 mg/dL    0.2-1.0  

 

 Alkaline Phosphatase  50 U/L      

 

 Alt  12 U/L    7-52  

 

 Ast  15 U/L    13-39  

 

 Egfr Non-  81.4    &gt;60  

 

 Egfr   104.7    &gt;60  76









 Lipid Profile (Trig/Chol/HDL)  2016  Triglycerides  154 mg/dL      77









 Cholesterol  141 mg/dL      78

 

 HDL Cholesterol  34.1 mg/dL      79

 

 LDL Cholesterol  76 mg/dL      80









 Laboratory test finding  2016  TSH (Thyroid Stim Horm)  1.65 ?IU/mL    
0.34-5.60  

 

 Liver Function Panel  2015  Total Protein  7.2 g/dL    6.4-8.9  81









 Albumin  3.9 g/dL    3.2-5.2  81

 

 Globulin  3.3 g/dL    2-4  81

 

 Albumin/Globulin Ratio  1.2    1-3  81

 

 Total Bilirubin  1.10 mg/dL  High  0.2-1.0  81

 

 Direct Bilirubin  0.40 mg/dL  High  0.03-0.18  81

 

 Indirect Bilirubin  0.7 mg/dL    0.3-1.0  81

 

 Alkaline Phosphatase  67 U/L      81

 

 Alt  34 U/L    7-52  81

 

 Ast  25 U/L    13-39  81









 Laboratory test finding  2015  Ammonia  89 ?mol/L  High  16-53  









 Lactic Acid  4.3 mmol/L  High  0.5-2.2  82









 CBC Auto Diff  2015  White Blood Count  9.1 10^3/uL    4.8-10.8  









 Red Blood Count  3.81 10^6/uL  Low  4.0-5.4  

 

 Hemoglobin  13.5 g/dL    12.0-16.0  

 

 Hematocrit  41 %    35-47  

 

 Mean Corpuscular Volume  107 fL  High  80-97  

 

 Mean Corpuscular Hemoglobin  35 pg  High  27-31  

 

 Mean Corpuscular HGB Conc  33 g/dL    31-36  

 

 Red Cell Distribution Width  12 %    10.5-15  

 

 Platelet Count  81 10^3/uL  Low  150-450  83

 

 Mean Platelet Volume  9 um3    7.4-10.4  

 

 Abs Neutrophils  8.6 10^3/uL  High  1.5-7.7  

 

 Abs Lymphocytes  0.3 10^3/uL  Low  1.0-4.8  

 

 Abs Monocytes  0.2 10^3/uL    0-0.8  

 

 Abs Eosinophils  0 10^3/uL    0-0.6  

 

 Abs Basophils  0 10^3/uL    0-0.2  

 

 Abs Nucleated RBC  0 10^3/uL      

 

 Granulocyte %  94.8 %  High  38-83  

 

 Lymphocyte %  2.9 %  Low  25-47  

 

 Monocyte %  2.1 %    1-9  

 

 Eosinophil %  0 %    0-6  

 

 Basophil %  0.2 %    0-2  

 

 Nucleated Red Blood Cells %  0      









 Urine Drug SCR ED  2015  Amphetamine Ur Screen  None Detected    None 
Detect  



 &[facility]; [facility]            









 Barbiturates Urine Screen  None Detected    None Detect  

 

 Benzodiazepine Urine Screen  None Detected    None Detect  

 

 Urine Cannabinoids Screen  None Detected    None Detect  

 

 Urine Cocaine Screen  None Detected    None Detect  

 

 Urine Opiates Screen  None Detected    None Detect  

 

 Urine Phencyclidine Screen  None Detected    None Detect  84









 Urinalysis Profile  2015  Urine Color  Yellow      









 Urine Appearance  Clear      

 

 Urine Specific Gravity  1.006  Low  1.010-1.030  

 

 Urine pH  6.0    5-9  

 

 Urine Urobilinogen  Negative    Negative  

 

 Urine Ketones  Negative    Negative  

 

 Urine Protein  1+(30 mg/dL)    Negative  

 

 Urine Leukocytes  1+    Negative  

 

 Urine Blood  Negative    Negative  

 

 Urine Nitrite  Negative    Negative  

 

 Urine Bilirubin  Negative    Negative  

 

 Urine Glucose  Negative    Negative  

 

 Urine White Blood Cell  2+(11-20/hpf)    Absent  

 

 Urine Red Blood Cell  Absent    Absent  

 

 Urine Bacteria  Absent    Absent  

 

 Urine Squamous Epithelial Cell  Present    Absent  









 Laboratory test finding  2015  Serum HCG Qualitative  Negative    
Negative  

 

 Comp Metabolic Panel  2015  Sodium  128 mmol/L  Low  133-145  









 Potassium  3.4 mmol/L  Low  3.5-5.0  

 

 Chloride  93 mmol/L  Low  101-111  

 

 Co2 Carbon Dioxide  20 mmol/L  Low  22-32  

 

 Anion Gap  15 mmol/L  High  2-11  

 

 Glucose  130 mg/dL  High    

 

 Blood Urea Nitrogen  14 mg/dL    6-24  

 

 Creatinine  1.14 mg/dL  High  0.51-0.95  

 

 BUN/Creatinine Ratio  12.3    8-20  

 

 Calcium  9.1 mg/dL    8.6-10.3  

 

 Total Protein  7.4 g/dL    6.4-8.9  

 

 Albumin  4.0 g/dL    3.2-5.2  

 

 Globulin  3.4 g/dL    2-4  

 

 Albumin/Globulin Ratio  1.2    1-3  

 

 Total Bilirubin  3.20 mg/dL  High  0.2-1.0  

 

 Alkaline Phosphatase  132 U/L  High    

 

 Egfr Non-  54.9    &gt;60  

 

 Egfr   70.6    &gt;60  85

 

 Alt  1397 U/L  High  7-52  

 

 Ast  5336 U/L  High  13-39  









 Laboratory test finding  2015  TSH (Thyroid Stimulating  4.00 ?IU/mL    
0.34-5.60  



     Horm)        









 Acetaminophen  &lt; 15 g/mL      86

 

 Alcohol  &lt; 10 mg/dL    &lt;10  

 

 Salicylate  &lt; 2.50 mg/dL    &lt;30  

 

 Direct Bilirubin  2.30 mg/dL  High  0.03-0.18  









 Laboratory test finding  2015  Lipase  9 U/L  Low  11.0-82.0  









 Urine Culture  SEE RESULT BELOW      87









 Comp Metabolic Panel  2015  Sodium  131 mmol/L  Low  133-145  









 Potassium  4.6 mmol/L    3.5-5.0  

 

 Chloride  89 mmol/L  Low  101-111  

 

 Co2 Carbon Dioxide  19 mmol/L  Low  22-32  

 

 Anion Gap  23 mmol/L  High  2-11  

 

 Glucose  55 mg/dL  Low    

 

 Blood Urea Nitrogen  13 mg/dL    6-24  

 

 Creatinine  1.31 mg/dL  High  0.51-0.95  

 

 BUN/Creatinine Ratio  9.9    8-20  

 

 Calcium  9.6 mg/dL    8.6-10.3  

 

 Total Protein  7.0 g/dL    6.4-8.9  

 

 Albumin  4.1 g/dL    3.2-5.2  

 

 Globulin  2.9 g/dL    2-4  

 

 Albumin/Globulin Ratio  1.4    1-3  

 

 Total Bilirubin  3.40 mg/dL  High  0.2-1.0  

 

 Alkaline Phosphatase  110 U/L  High    

 

 Egfr Non-  46.8    &gt;60  

 

 Egfr   60.1    &gt;60  88

 

 Alt  745 U/L  High  7-52  

 

 Ast  2450 U/L  High  13-39  









 CBC Auto Diff  2015  White Blood Count  12.7 10^3/uL  High  4.8-10.8  









 Red Blood Count  3.96 10^6/uL  Low  4.0-5.4  

 

 Hemoglobin  13.9 g/dL    12.0-16.0  

 

 Hematocrit  42 %    35-47  

 

 Mean Corpuscular Volume  106 fL  High  80-97  89

 

 Mean Corpuscular Hemoglobin  35 pg  High  27-31  

 

 Mean Corpuscular HGB Conc  33 g/dL    31-36  

 

 Red Cell Distribution Width  12 %    10.5-15  

 

 Platelet Count  85 10^3/uL  Low  150-450  

 

 Mean Platelet Volume  9 um3    7.4-10.4  

 

 Abs Neutrophils  11.5 10^3/uL  High  1.5-7.7  

 

 Abs Lymphocytes  0.6 10^3/uL  Low  1.0-4.8  

 

 Abs Monocytes  0.4 10^3/uL    0-0.8  

 

 Abs Eosinophils  0 10^3/uL    0-0.6  

 

 Abs Basophils  0.1 10^3/uL    0-0.2  

 

 Abs Nucleated RBC  0 10^3/uL      

 

 Granulocyte %  91.0 %  High  38-83  

 

 Lymphocyte %  4.8 %  Low  25-47  

 

 Monocyte %  3.6 %    1-9  

 

 Eosinophil %  0.1 %    0-6  

 

 Basophil %  0.5 %    0-2  

 

 Nucleated Red Blood Cells %  0      









 Laboratory test finding  2015  Vitamin B12  &gt; 1450 pg/mL  High  180-
914  90









 Kait (Anti-Nuclear AB) Screen  Negative    Negative  

 

 Smooth Muscle Antibody  Negative    Negative  91

 

 Liver/Kidney Microsomes Ab  &lt;5.0 U      92









 Inr/Protime  2015  Inr  2.42  High  0.89-1.11  93

 

 CBC Auto Diff  2015  White Blood Count  4.1 10^3/uL  Low  4.8-10.8  









 Red Blood Count  4.03 10^6/uL    4.0-5.4  

 

 Hemoglobin  14.3 g/dL    12.0-16.0  

 

 Hematocrit  43 %    35-47  

 

 Mean Corpuscular Volume  106 fL  High  80-97  

 

 Mean Corpuscular Hemoglobin  35 pg  High  27-31  

 

 Mean Corpuscular HGB Conc  34 g/dL    31-36  

 

 Red Cell Distribution Width  13 %    10.5-15  

 

 Platelet Count  123 10^3/uL  Low  150-450  

 

 Mean Platelet Volume  8 um3    7.4-10.4  

 

 Abs Neutrophils  1.7 10^3/uL    1.5-7.7  

 

 Abs Lymphocytes  1.8 10^3/uL    1.0-4.8  

 

 Abs Monocytes  0.5 10^3/uL    0-0.8  

 

 Abs Eosinophils  0.1 10^3/uL    0-0.6  

 

 Abs Basophils  0 10^3/uL    0-0.2  

 

 Abs Nucleated RBC  0.01 10^3/uL      

 

 Granulocyte %  40.5 %    38-83  

 

 Lymphocyte %  43.8 %    25-47  

 

 Monocyte %  12.7 %  High  1-9  

 

 Eosinophil %  2.0 %    0-6  

 

 Basophil %  1.0 %    0-2  

 

 Nucleated Red Blood Cells %  0.1      









 Laboratory test  2015  D Dimer Quantitative  258 ng/mL  High  Less Than 
230  94



 finding            

 

 Comp Metabolic Panel  2015  Sodium  137 mmol/L    133-145  









 Potassium  3.3 mmol/L  Low  3.5-5.0  

 

 Chloride  100 mmol/L  Low  101-111  

 

 Co2 Carbon Dioxide  27 mmol/L    22-32  

 

 Anion Gap  10 mmol/L    2-11  

 

 Glucose  106 mg/dL  High    

 

 Blood Urea Nitrogen  13 mg/dL    6-24  

 

 Creatinine  0.85 mg/dL    0.51-0.95  

 

 BUN/Creatinine Ratio  15.3    8-20  

 

 Calcium  9.2 mg/dL    8.6-10.3  

 

 Total Protein  7.7 g/dL    6.4-8.9  

 

 Total Bilirubin  0.50 mg/dL    0.2-1.0  

 

 Alkaline Phosphatase  56 U/L      

 

 Alt  112 U/L  High  7-52  

 

 Ast  182 U/L  High  13-39  

 

 Egfr Non-  77.0    &gt;60  

 

 Egfr   99.1    &gt;60  95

 

 Albumin  4.9 g/dL    3.2-5.2  

 

 Globulin  2.8 g/dL    2-4  

 

 Albumin/Globulin Ratio  1.8    1-3  









 Laboratory test finding  2015  Troponin I  0.00 ng/mL    &lt;0.03  96









 TSH (Thyroid Stimulating Horm)  2.23 ?IU/mL    0.34-5.60  









 Gardnerella/Yeast:  2015  Gardnerella/Yeast:  (SEE NOTE)      97, 98



 Vaginal Dna    Vaginal Dna        

 

 Laboratory test  2015  Trichomonas vaginalis  Negative    Negative  97, 
99



 finding    Rna        

 

 GC/Chlamydia Amplified  2015  Chlamydia trachomatis  Negative    
Negative  97



 Rna    Rna        









 Neisseria gonorrhoeae (GC) Rna  Negative    Negative  97, 100









 HSV/VZV Derm PCR  2015  hs/VZ Source  vulva      97









 HSV 1 PCR  Negative    Negative  97

 

 HSV 2 PCR  Negative    Negative  97, 101

 

 Varicella Zoster Source  vulva      97

 

 Varicella Zoster Result  Negative    Negative  97, 102









 Urine Culture And  2015  Urine Culture  (SEE NOTE)      103



 Sensitivities            

 

 Laboratory test finding  2014  Vitamin B12  287 pg/mL    180-914  104

 

 CBC Auto Diff  2014  White Blood Count  5.3 10^3/uL    4.8-10.8  









 Red Blood Count  3.82 10^6/uL  Low  4.0-5.4  

 

 Hemoglobin  13.5 g/dL    12.0-16.0  

 

 Hematocrit  40 %    35-47  

 

 Mean Corpuscular Volume  104 fL  High  80-97  

 

 Mean Corpuscular Hemoglobin  35 pg  High  27-31  

 

 Mean Corpuscular HGB Conc  34 g/dL    31-36  

 

 Red Cell Distribution Width  14 %    10.5-15  

 

 Platelet Count  136 10^3/uL  Low  150-450  

 

 Mean Platelet Volume  8 um3    7.4-10.4  

 

 Abs Neutrophils  3.4 10^3/uL    1.5-7.7  

 

 Abs Lymphocytes  1.5 10^3/uL    1.0-4.8  

 

 Abs Monocytes  0.4 10^3/uL    0-0.8  

 

 Abs Eosinophils  0 10^3/uL    0-0.6  

 

 Abs Basophils  0 10^3/uL    0-0.2  

 

 Abs Nucleated RBC  0 10^3/uL      

 

 Granulocyte %  63.9 %    38-83  

 

 Lymphocyte %  27.6 %    25-47  

 

 Monocyte %  7.4 %    1-9  

 

 Eosinophil %  0.5 %    0-6  

 

 Basophil %  0.6 %    0-2  

 

 Nucleated Red Blood Cells %  0.1      









 CMP - Comprehensive Metabolic  2014  Sodium  137 mmol/L    133-145  









 Potassium  3.3 mmol/L  Low  3.7-5.6  

 

 Chloride  105 mmol/L    101-111  

 

 Co2 Carbon Dioxide  23 mmol/L    22-32  

 

 Anion Gap  9 mmol/L    2-11  

 

 Glucose  107 mg/dL  High    

 

 Blood Urea Nitrogen  7 mg/dL    6-24  

 

 Creatinine  0.68 mg/dL    0.51-0.95  

 

 BUN/Creatinine Ratio  10.3    8-20  

 

 Calcium  8.6 mg/dL    8.6-10.3  

 

 Total Protein  7.4 g/dL    6.4-8.9  

 

 Albumin  4.3 g/dL    3.2-5.2  

 

 Globulin  3.1 g/dL    2-4  

 

 Albumin/Globulin Ratio  1.4    1-3  

 

 Total Bilirubin  0.40 mg/dL    0.2-1.0  

 

 Alkaline Phosphatase  42 U/L      

 

 Alt  24 U/L    7-52  

 

 Ast  80 U/L  High  13-39  

 

 Egfr Non-  100.3    &gt;60  

 

 Egfr   129.0    &gt;60  105









 Laboratory test finding  2014  Amylase  22 U/L  Low    









 Lipase  29 U/L    11.0-82.0  









 Laboratory test finding  2014  HCG DIP Test  NEG    Neg  

 

 Urine Culture And  2014  Urine Culture  (SEE NOTE)      106



 Sensitivities            

 

 Urine DIP  2014  Specific Gravity  1.015    1.01-1.02  









 Urine pH  6    5-6  

 

 Leukocytes  ++    Neg  

 

 Urine Nitrites  NEG    Neg  

 

 Total Protein, Urine  TRACE    Neg  

 

 Urine Glucose  NORM    Norm  

 

 Urine Ketones  NEG    Neg  

 

 Urobilinogen  NORM    Norm  

 

 Urine Bilirubin  NRG    Neg  

 

 Urine Blood  TRACE    Neg  









 Urine DIP  2014  Specific Gravity  1.025  High  1.01-1.02  









 Urine pH  5    5-6  

 

 Leukocytes  NEG    Neg  

 

 Urine Nitrites  NEG    Neg  

 

 Total Protein, Urine  TRACE    Neg  

 

 Urine Glucose  NORM    Norm  

 

 Urine Ketones  NEG    Neg  

 

 Urobilinogen  NORM    Norm  

 

 Urine Bilirubin  NEG    Neg  

 

 Urine Blood  50  High  Neg  









 Laboratory test finding  2014  Creatine Kinase  125 U/L      107, 
108









 Troponin I  0.00 ng/mL    &lt;0.03  107, 109

 

 Acetaminophen  &lt; 15 g/mL      107, 110

 

 Alcohol  303 mg/dL  High  &lt;10  107, 111

 

 TSH (Thyroid Stimulating Horm)  1.43 IU/mL    0.34-5.60  107, 112









 Comp Metabolic Panel  2014  Sodium  141 mmol/L    133-145  107









 Potassium  3.4 mmol/L  Low  3.7-5.6  107

 

 Chloride  108 mmol/L    101-111  107

 

 Co2 Carbon Dioxide  24 mmol/L    22-32  107

 

 Anion Gap  9 mmol/L    2-11  107

 

 Glucose  111 mg/dL  High    107

 

 Blood Urea Nitrogen  10 mg/dL    6-24  107

 

 Creatinine  0.69 mg/dL    0.51-0.95  107

 

 BUN/Creatinine Ratio  14.5    8-20  107

 

 Calcium  8.4 mg/dL  Low  8.6-10.3  107

 

 Total Protein  7.1 g/dL    6.4-8.9  107

 

 Albumin  4.0 g/dL    3.2-5.2  107

 

 Globulin  3.1 g/dL    2-4  107

 

 Albumin/Globulin Ratio  1.3    1-3  107

 

 Total Bilirubin  0.20 mg/dL    0.2-1.0  107

 

 Alkaline Phosphatase  49 U/L      107

 

 Alt  30 U/L    7-52  107

 

 Ast  44 U/L  High  13-39  107

 

 Egfr Non-  98.6    &gt;60  107

 

 Egfr   126.8    &gt;60  107, 113









 Laboratory test finding  2014  Activated Partial  31.0 seconds    24.0-
36.1  107



     Thrombo Time        









 D Dimer Quantitative  &lt; 200 ng/mL    Less Than 230  107, 114









 Inr/Protime  2014  Inr  0.86    0.85-1.06  107

 

 Laboratory test  2014  Serum Pregnancy  Negative    Negative  107, 115



 finding            

 

 CBC Auto Diff  2014  White Blood Count  4.5 10^3/uL  Low  4.8-10.8  107









 Red Blood Count  3.66 10^6/uL  Low  4.0-5.4  107

 

 Hemoglobin  12.9 g/dL    12.0-16.0  107

 

 Hematocrit  37 %    35-47  107

 

 Mean Corpuscular Volume  101 fL  High  80-97  107

 

 Mean Corpuscular Hemoglobin  35 pg  High  27-31  107

 

 Mean Corpuscular HGB Conc  35 g/dL    31-36  107

 

 Red Cell Distribution Width  13 %    10.5-15  107

 

 Platelet Count  223 10^3/uL    150-450  107

 

 Mean Platelet Volume  8 um3    7.4-10.4  107

 

 Abs Neutrophils  1.8 10^3/uL    1.5-7.7  107

 

 Abs Lymphocytes  2.1 10^3/uL    1.0-4.8  107

 

 Abs Monocytes  0.5 10^3/uL    0-0.8  107

 

 Abs Eosinophils  0 10^3/uL    0-0.6  107

 

 Abs Basophils  0 10^3/uL    0-0.2  107

 

 Abs Nucleated RBC  0 10^3/uL      107

 

 Granulocyte %  40.4 %    38-83  107

 

 Lymphocyte %  46.9 %    25-47  107

 

 Monocyte %  11.6 %  High  1-9  107

 

 Eosinophil %  0.6 %    0-6  107

 

 Basophil %  0.5 %    0-2  107

 

 Nucleated Red Blood Cells %  0      107









 Urine Drug SCR ED  2014  Amphetamine Ur Screen  None Detected    None 
Detect  116



 &amp; [facility]            









 Barbiturates Urine Screen  None Detected    None Detect  116

 

 Benzodiazepine Urine Screen  None Detected    None Detect  116

 

 Urine Cannabinoids Screen  None Detected    None Detect  116

 

 Urine Cocaine Screen  None Detected    None Detect  116

 

 Urine Opiates Screen  None Detected    None Detect  116

 

 Urine Phencyclidine Screen  None Detected    None Detect  116, 117









 Urine DIP  2013  Leukocytes  1+  High  Neg  









 Urine Nitrites  neg    Neg  

 

 Urine pH  5    5-6  

 

 Total Protein, Urine  neg    Neg  

 

 Urine Glucose  norm    Norm  

 

 Urine Ketones  neg    Neg  

 

 Urobilinogen  norm    Norm  

 

 Urine Bilirubin  neg    Neg  

 

 Urine Blood  neg    Neg  

 

 Specific Gravity  1.010    1.01-1.02  









 Laboratory test  2013  Cytology  RUN DATE: 12/10/      118



 finding      &lt;SEE NOTE&gt;      

 

 GC/Chlamydia Amplified  2013  GC/Chlamydia Rna  (SEE NOTE)      119



 Rna            

 

 Laboratory test  2013  Affirm Vaginal Dna Probe  (SEE NOTE)      120



 finding            

 

 Human Papilloma  2013  Human Papillomavirus  See Comment      121



     Source        









 Human Papillomavirus High Risk  Negative    Negative  122









 Herpes Simplex Type  2013  Herpes Simplex Type 1 2  Negative    Negative
  123



 1&amp;2 Igm    IgM        

 

 Herpes Simplex Type  2013  Herpes Simplex Virus I  Positive    Negative  



 1&amp;2 Igg    IgG AB        









 Herpes Simplex Virus II IgG AB  Negative    Negative  124









 Urine DIP  10/07/2013  Leukocytes  neg    Neg  









 Urine Nitrites  neg    Neg  

 

 Urine pH  9  High  5-6  

 

 Total Protein, Urine  neg    Neg  

 

 Urine Glucose  norm    Norm  

 

 Urine Ketones  neg    Neg  

 

 Urobilinogen  norm    Norm  

 

 Urine Bilirubin  neg    Neg  

 

 Urine Blood  trace    Neg  

 

 Specific Gravity  1.000  Low  1.01-1.02  









 Stool For Blood  2013  Stool Occult Blood  (SEE NOTE)      125

 

 Stool Panel ([facility])  2013  O P: Giardia/Cryptospor Screen  (SEE NOTE)      
126









 Stool Culture  (SEE NOTE)      127









 Laboratory test finding  2013  C. difficile Amplified Dna  (SEE NOTE)   
   128









 Fecal Lactoferrin (Stool WBC)  (SEE NOTE)      129









 Urine DIP  2013  Leukocytes  neg    Neg  









 Urine Nitrites  neg    Neg  

 

 Urine pH  7  High  5-6  

 

 Total Protein, Urine  neg    Neg  

 

 Urine Glucose  norm    Norm  

 

 Urine Ketones  neg    Neg  

 

 Urobilinogen  norm    Norm  

 

 Urine Bilirubin  neg    Neg  

 

 Urine Blood  50+  High  Neg  

 

 Specific Gravity  1.005  Low  1.01-1.02  









 Urine Culture And  2013  Urine Culture  (SEE NOTE)      130



 Sensitivities            

 

 CBC No Diff  2013  White Blood Count  6.2 10^3/uL    4.8-10.8  









 Red Blood Count  3.69 10^6/uL  Low  4.0-5.4  

 

 Hemoglobin  12.5 g/dL    12.0-16.0  

 

 Hematocrit  37 %    35-47  

 

 Mean Corpuscular Volume  101 fL  High  80-97  

 

 Mean Corpuscular Hemoglobin  34 pg  High  27-31  

 

 Mean Corpuscular HGB Conc  34 g/dL    31-36  

 

 Red Cell Distribution Width  12 %    10.5-15  

 

 Platelet Count  194 10^3/uL    150-450  

 

 Mean Platelet Volume  10 um3    7.4-10.4  









 Laboratory test finding  2013  Vitamin B12  240 pg/mL    180-914  

 

 Basic Metabolic Panel  2013  Sodium  141 mmol/L    133-145  









 Potassium  3.9 mmol/L    3.5-5.0  

 

 Chloride  106 mmol/L    101-111  

 

 Co2 Carbon Dioxide  28.0 mmol/L    22-32  

 

 Anion Gap  7.0 mmol/L    2-11  

 

 Glucose  88 mg/dL      

 

 Blood Urea Nitrogen  8 mg/dL    6-24  

 

 Creatinine  0.80 mg/dL    0.50-1.40  

 

 BUN/Creatinine Ratio  10.0    8-20  

 

 Calcium  9.1 mg/dL    8.1-9.9  

 

 Egfr Non-  83.7    &gt;60  

 

 Egfr   107.6    &gt;60  131









 Laboratory test finding  2013  TSH (Thyroid  1.07 miu/mL    0.34-5.60  



     Stimulating Horm)        

 

 Urine Culture And  2013  Urine Culture  (SEE NOTE)      132



 Sensitivities            

 

 Affirm Vaginal Dna Probe  2013  Affirm Vaginal Dna  (SEE NOTE)      133



     Probe        

 

 Laboratory test finding  2012  TSH  1.01 MIU/ML    0.34-5.60  









 Vitamin B12  386 pg/mL    180-914  

 

 Vitamin D, 1,25 Dihydroxy  41 pg/mL    18-78  134









 1  SEE RESULT BELOW



   -----------------------------------------------------------------------------
---------------



   Name:  VALERIE GRACIA              : 1980    Attend Dr: Ryan Dsouza NP



   Acct:  [id number]  Unit: [id number]  AGE: 37            Location:  [facility]



   Re17                        SEX: F             Status:    REG REF



   -----------------------------------------------------------------------------
---------------



   



   SPEC: [id number]         LUIZ:       [facility] DR: Ryan Dsouza NP



   REQ:  [id number]              RECD:   



   STATUS: COMP



   _



   SOURCE: VAGINAL        SPDESC:



   ORDERED:  Yasemin,Yeast DNA



   COMMENTS: [id number]



   



   -----------------------------------------------------------------------------
---------------



   Procedure                         Result                         Reported   
        Site



   -----------------------------------------------------------------------------
---------------



   Gardnerella/Yeast: Vaginal DNA  Final                            [id number]
[id number]      ML



   



   Organism 1                     POSITIVE GARDNERELLA



   Organism 2                     Negative Candida



   



   The presence of G. vaginalis, although suggestive, is not



   diagnostic for bacterial vaginosis.  Results should be



   interpreted in conjuction with other clinical and laboratory



   data available.



   



   Women with vaginal discharge should be evaluated for risk



   factors of cervicitis and pelvic inflammatory disease, toxic



   shock syndrome (S.aureus), and if present, evaluated for



   organisms not included in this assay such as N. gonorrhoeae,



   C. trachomatis, Mobiluncus, Mycoplasma and/or Prevotella.



   Mixed infections may occur.



   



   The performance of this test on patient specimens collected



   during or immediately after antimicrobial therapy is



   unknown. The presence or absence of Candida species, or G.



   vaginalis  cannot be used as a test for therapeutic success



   or failure.



   



   -----------------------------------------------------------------------------
---------------



   * ML - MAIN LAB ([facility])



   .



   



   



   



   



   



   ** END OF REPORT **



   



   * ML=Testing performed at Main Lab



   DEPARTMENT OF PATHOLOGY,  [address] [place] [address]



   Phone # [phone]      Fax #[phone]



   Андрей Jarquin M.D. Director     [facility] # [id number]

 

 2  [id number]



   GC/Chlamydia Source?: Endocervical



   Trichomonas Source: Endocervical

 

 3  [id number]

 

 4  No bands detected

 

 5  No bands detected

 

 6  Specific serologic response to B. burgdorferi infection is



   not detected, but cannot rule out early infection during



   which low or undetectable antibody levels to B. burgdorferi



   may be present.  If clinically indicated, a new serum



   specimen should be submitted in 7-14 days.



   -------------------ADDITIONAL INFORMATION-------------------



   CDC criteria require &gt;=5 bands for IgG or &gt;=2 bands for IgM



   for the Immunoblot to be considered positive. Bands



   (e.g.,p41) may be detected in patients without Lyme



   disease, and patterns not meeting the CDC criteria should



   be interpreted with caution.



   Immunoblot should be ordered only on specimens that are



   positive or equivocal by a FDA-licensed Lyme disease



   antibody screening test (e.g., EIA).



   Test Performed by:



   [facility] [employer] - [address] [address]

 

 7  -------------------REFERENCE VALUE--------------------------



   Cutoff: 500

 

 8  -------------------REFERENCE VALUE--------------------------



   Cutoff: 200

 

 9  -------------------REFERENCE VALUE--------------------------



   Cutoff: 100

 

 10  -------------------REFERENCE VALUE--------------------------



   Cutoff: 150

 

 11  -------------------ADDITIONAL INFORMATION-------------------



   This report is intended for use in clinical monitoring or



   management of patients.  It is not intended for use in



   employment-related testing.

 

 12  -------------------REFERENCE VALUE--------------------------



   Cutoff: 200 mg/L

 

 13  Tylenol 3

 

 14  Metabolite of codeine



   -------------------REFERENCE VALUE--------------------------



   Cutoff: 100

 

 15  Farzana Rooney, MS Contin; Also a minor metabolite (10%) of



   codeine and can be seen in low concentrations (&lt;2,000



   ng/mL) with poppy seed ingestion.

 

 16  Metabolite of morphine



   -------------------REFERENCE VALUE--------------------------



   Cutoff: 100

 

 17  Metabolite of heroin

 

 18  Lortab, Norco, Vicodin; Also a very minor metabolite of



   codeine and impurity (&lt;1%) of oxycodone.

 

 19  Metabolite of hydrocodone

 

 20  Metabolite of hydrocodone

 

 21  Dilaudid, Exalgo; Also a metabolite of hydrocodone and a



   minor (&lt;5%) metabolite of morphine.

 

 22  Metabolite of hydromorphone



   -------------------REFERENCE VALUE--------------------------



   Cutoff: 100

 

 23  Endocet, Percocet, Oxycontin

 

 24  Metabolite of oxycodone

 

 25  Numorphan, Opana; Also a metabolite of oxycodone.

 

 26  Metabolite of oxymorphone



   -------------------REFERENCE VALUE--------------------------



   Cutoff: 100

 

 27  Metabolite of oxymorphone

 

 28  Actiq, Duragesic, [place]

 

 29  Metabolite of fentanyl

 

 30  Demerol

 

 31  Metabolite of meperidine

 

 32  Narcan

 

 33  Metabolite of naloxone



   -------------------REFERENCE VALUE--------------------------



   Cutoff: 100

 

 34  Dolophine

 

 35  Metabolite of methadone

 

 36  Darvon, Darvocet

 

 37  Metabolite of propoxyphene

 

 38  Tradol, Ultram, Ultracet

 

 39  Metabolite of tramadol

 

 40  Nucynta

 

 41  Metabolite of tapentadol

 

 42  Metabolite of tapentadol



   -------------------REFERENCE VALUE--------------------------



   Cutoff: 100

 

 43  Buprenex, Suboxone

 

 44  Metabolite of buprenorphine

 

 45  Metabolite of buprenorphine

 

 46  Test detected the presence of oxycodone and several



   metabolites (noroxycodone, oxymorphone, noroxymorphone, and



   oxymorphone-3-beta-glucuronide). Suspect use of oxycodone



   or possibly oxycodone and oxymorphone within the past three



   days.



   -------------------ADDITIONAL INFORMATION-------------------



   This test was developed and its performance characteristics



   determined by [facility] in a manner consistent with CLIA



   requirements. This test has not been cleared or approved by



   the U.S. Food and Drug Administration.



   Test Performed by:



   [facility] - [address] [address]

 

 47  SEE RESULT BELOW



   -----------------------------------------------------------------------------
---------------



   Name:  VALERIE GRACIA              : 1980    Attend Dr: Ryan Dsouza NP



   Acct:  [id number]  Unit: [id number]  AGE: 36            Location:  [facility]



   Re17                        SEX: F             Status:    REG REF



   -----------------------------------------------------------------------------
---------------



   



   SPEC: [id number]         LUIZ:   36    [facility] DR: Ryan Dsouza NP



   REQ:  [id number]              RECD:   



   STATUS: COMP



   _



   SOURCE: VAGINAL        [facility]:



   ORDERED:  Yasemin,Yeast DNA



   COMMENTS: [id number]



   



   -----------------------------------------------------------------------------
---------------



   Procedure                         Result                         Reported   
        Site



   -----------------------------------------------------------------------------
---------------



   Gardnerella/Yeast: Vaginal DNA  Final                            17-
1106      ML



   



   Organism 1                     Negative Gardnerella



   Organism 2                     Negative Candida



   



   The presence of G. vaginalis, although suggestive, is not



   diagnostic for bacterial vaginosis.  Results should be



   interpreted in conjuction with other clinical and laboratory



   data available.



   



   Women with vaginal discharge should be evaluated for risk



   factors of cervicitis and pelvic inflammatory disease, toxic



   shock syndrome (S.aureus), and if present, evaluated for



   organisms not included in this assay such as N. gonorrhoeae,



   C. trachomatis, Mobiluncus, Mycoplasma and/or Prevotella.



   Mixed infections may occur.



   



   The performance of this test on patient specimens collected



   during or immediately after antimicrobial therapy is



   unknown. The presence or absence of Candida species, or G.



   vaginalis  cannot be used as a test for therapeutic success



   or failure.



   



   -----------------------------------------------------------------------------
---------------



   * ML - MAIN LAB ([facility])



   .



   



   



   



   



   



   ** END OF REPORT **



   



   * ML=Testing performed at Main Lab



   DEPARTMENT OF PATHOLOGY,  [address] [place] [address]



   Phone # [phone]      Fax #[phone]



   Андрей Jarquin M.D. Director     [facility] # [id number]

 

 48  [id number]



   GC/Chlamydia Source?: Endocervical



   Trichomonas Source: Endocervical

 

 49  *******Because ethnic data is not always readily available,



   this report includes an eGFR for both -Americans and



   non- Americans.****



   The National Kidney Disease Education Program (NKDEP) does



   not endorse the use of the MDRD equation for patients that



   are not between the ages of 18 and 70, are pregnant, have



   extremes of body size, muscle mass, or nutritional status,



   or are non- or non-.



   According to the National Kidney Foundation, irrespective of



   diagnosis, the stage of the disease is based on the level of



   kidney function:



   Stage Description                      GFR(mL/min/1.73 m(2))



   1     Kidney damage with normal or decreased GFR       90



   2     Kidney damage with mild decrease in GFR          60-89



   3     Moderate decrease in GFR                         30-59



   4     Severe decrease in GFR                           15-29



   5     Kidney failure                       &lt;15 (or dialysis)

 

 50  Acute inflammation:  &gt;10.00

 

 51  &lt;5.0 Negative



   



   5.0 - 25.0  Indeterminate (Repeat testing recommended after



   72 hours)



   &gt;25.0  Positive



   



   Perimenopausal women can display HCG levels of up to 20



   mIU/mL

 

 52  [id number]

 

 53  SEE RESULT BELOW



   -----------------------------------------------------------------------------
---------------



   Name:  VALERIE GRACIA              : 1980    Attend Dr: Latonia Sanchez MD



   Acct:  [id number]  Unit: [id number]  AGE: 36            Location:  [facility]



   Re16                        SEX: F             Status:    DEP ER



   -----------------------------------------------------------------------------
---------------



   



   SPEC: [id number]         LUIZ:   16-    [facility] DR: Latonia Sanchez MD



   REQ:  [id number]              RECD:   



   STATUS: COMP             OTHR DR: [facility]



   Ryan Dsouza NP



   _



   SOURCE: URINE          SPDESC:



   ORDERED:  Urine Culture



   COMMENTS: [id number]



   



   -----------------------------------------------------------------------------
---------------



   Procedure                         Result                         Reported   
        Site



   -----------------------------------------------------------------------------
---------------



   Urine Culture  Final                                             16-
1223      ML



   No Growth (&lt;1,000 CFU/mL)



   



   -----------------------------------------------------------------------------
---------------



   * ML - MAIN LAB ([facility])



   .



   



   



   



   



   



   



   



   



   



   



   



   



   



   



   



   



   



   



   



   



   



   



   



   



   



   ** END OF REPORT **



   



   * ML=Testing performed at Main Lab



   DEPARTMENT OF PATHOLOGY,  [address] [place] [address]



   Phone # [phone]      Fax #[phone]



   Андрей Jarquin M.D. Director     [facility] # [id number]

 

 54  SEE RESULT BELOW



   -----------------------------------------------------------------------------
---------------



   Name:  VALERIE GRACIA              : 1980    Attend Dr: Ryan Dsouza NP



   Acct:  [id number]  Unit: [id number]  AGE: 36            Location:  [facility]



   Re16                        SEX: F             Status:    REG REF



   -----------------------------------------------------------------------------
---------------



   



   SPEC: [id number]         LUIZ:       SUBM DR: Ryan Dsouza NP



   REQ:  [id number]              RECD:   



   STATUS: COMP



   _



   SOURCE: URINE          SPDESC:



   ORDERED:  Urine Culture



   



   -----------------------------------------------------------------------------
---------------



   Procedure                         Result                         Reported   
        Site



   -----------------------------------------------------------------------------
---------------



   Urine Culture  Final                                             16-
919      ML



   No Growth (&lt;1,000 CFU/mL)



   



   -----------------------------------------------------------------------------
---------------



   * ML - MAIN LAB ([facility])



   .



   



   



   



   



   



   



   



   



   



   



   



   



   



   



   



   



   



   



   



   



   



   



   



   



   



   



   ** END OF REPORT **



   



   * ML=Testing performed at Main Lab



   DEPARTMENT OF PATHOLOGY,  [address] [place] [address]



   Phone # [phone]      Fax #[phone]



   Андрей Jarquin M.D. Director     [facility] # [id number]

 

 55  Test Performed by:



   [place] [address]



   : Jesse Bernard II, M.D., Ph.D.

 

 56  Normal Range 180 to 914



   Indeterminate Range 145 to 180



   Deficient Range  &lt;145

 

 57  Test Performed by:



   [address] [address]



   : Jesse Bernard II, M.D., Ph.D.

 

 58  Acute inflammation:  &gt;10.00

 

 59  -------------------REFERENCE VALUE--------------------------



   &lt;20.0 (Negative)



   Test Performed by:



   [facility] - [address] [address]



   : Jesse Bernard II, M.D., Ph.D.

 

 60  No bands detected

 

 61  No bands detected

 

 62  Specific serologic response to B. burgdorferi infection is



   not detected, but cannot rule out early infection during



   which low or undetectable antibody levels to B. burgdorferi



   may be present.  If clinically indicated, a new serum



   specimen should be submitted in 7-14 days.



   -------------------ADDITIONAL INFORMATION-------------------



   CDC criteria require &gt;=5 bands for IgG or &gt;=2 bands for IgM



   for the Immunoblot to be considered positive. Bands



   (e.g.,p41) may be detected in patients without Lyme



   disease, and patterns not meeting the CDC criteria should



   be interpreted with caution.



   Immunoblot should be ordered only on specimens that are



   positive or equivocal by a FDA-licensed Lyme disease



   antibody screening test (e.g., EIA).



   Test Performed by:



   [facility] - [address] [address]



   : Jesse Bernard II, M.D., Ph.D.

 

 63  -------------------REFERENCE VALUE--------------------------



   Cutoff: 500

 

 64  -------------------REFERENCE VALUE--------------------------



   Cutoff: 200

 

 65  -------------------REFERENCE VALUE--------------------------



   Cutoff: 200

 

 66  -------------------REFERENCE VALUE--------------------------



   Cutoff: 150

 

 67  -------------------REFERENCE VALUE--------------------------



   Cutoff: 150

 

 68  -------------------REFERENCE VALUE--------------------------



   Cutoff: 300

 

 69  -------------------ADDITIONAL INFORMATION-------------------



   This report is intended for use in clinical monitoring or



   management of patients.  It is not intended for use in



   employment-related testing.

 

 70  Presumptive Positive



   Drug confirmation to follow.  Presumptive Positive means



   that the screening method is positive, but the test needs



   to be run by a confirmatory method before being finalized.



   -------------------REFERENCE VALUE--------------------------



   Cutoff: 100



   -------------------ADDITIONAL INFORMATION-------------------



   This report is intended for use in clinical monitoring or



   management of patients.  It is not intended for use in



   employment-related testing.



   Test Performed by:



   [address] [address]



   : Jesse Bernard II, MPEACE, Ph.D.

 

 71  -------------------REFERENCE VALUE--------------------------



   Cutoff: 100

 

 72  -------------------REFERENCE VALUE--------------------------



   Cutoff: 100

 

 73  -------------------ADDITIONAL INFORMATION-------------------



   This report is intended for use in clinical monitoring and



   management of patients.  It is not intended for use in



   employment-related testing.



   Test Performed by:



   [facility] - [address] [address]



   : Jesse Bernard II, M.D., Ph.D.

 

 74  SEE RESULT BELOW



   -----------------------------------------------------------------------------
---------------



   Name:  VALERIE GRACIA                : 1980    Attend Dr: Ryan Dsouza NP



   Acct:  [id number]  Unit: [id number]  AGE: 35            Location:  [facility]



   Re16                        SEX: F             Status:    REG REF



   -----------------------------------------------------------------------------
---------------



   



   SPEC: [id number]             LUIZ:       [facility] DR: Ryan Dsouza NP



   REQ:  [id number]             RECD: [id number]



   STATUS: SOUT



   _



   ORDERED:  IMAGE ANALYSIS, HPV/Thin Prep, HPV 16/18 GENE



   



   FINAL DIAGNOSIS



   



   Negative for Intraepithelial lesion or Malignancy



   A. Ectocervical/Endocervical



   Specimen Adequacy:



   Satisfactory of evaluation



   Transformation zone component identified



   Patient Information:



   HPV: High risk HPV RNA testing regardless of pap results.



   HPV 16/18 Genotype for HPV pos



   Actual Specimen Date:         16



   LMP If Unknown:               Oct 2015



   Date of Last Specimen:        13



   Pregnant?:     N



   Post Menopausal?:             N



   Hysterectomy?:                N



   



   -----------------------------------------------------------------------------
---------------



   Date     Time Test            Result   Flag (u) Normal Range



   16 1447 HPV RNA RFLX GE POSITIVE  H       Negative



   



   The high-risk HPV types detected by the assay include: 16,



   18, 31, 33, 35, 39, 45, 51, 52, 56, 58, 59, 66, and 68.



   -----------------------------------------------------------------------------
---------------



   



   



   Signed __________(signature on file)___________ SUSHIL Herrera(ASCP) 02/15
/16 1554



   



   This Pap test was evaluated with the assistance of the TOPSECp Test Imaging 
System. Due to



   cytologic findings at the imager microscope, comprehensive manual 
rescreening by a



   Cytotechnologist may be required.



   The Pap Smear is a screening test designed to aid in the detection of 
premalignant and



   malignant conditions of the uterine cervix.  It is not a diagnostic 
procedure and should



   not be used as the sole means of detecting cervical cancer.  Both false-
positive and false-



   negative reports do occur.  Depending on your risk status, a Pap smear 
should be obtained



   and evaluated every 1-3 years.



   



   -----------------------------------------------------------------------------
---------------



   



   ** END OF REPORT **



   



   * ML=Testing performed at Main Lab



   DEPARTMENT OF PATHOLOGY,  [address] [place] [address]



   Phone # [phone]      Fax #[phone]



   Андрей Jarquin M.D. Director     [facility] # [id number]

 

 75  The high-risk HPV types detected by the assay include: 16,



   18, 31, 33, 35, 39, 45, 51, 52, 56, 58, 59, 66, and 68.

 

 76  *******Because ethnic data is not always readily available,



   this report includes an eGFR for both -Americans and



   non- Americans.****



   The National Kidney Disease Education Program (NKDEP) does



   not endorse the use of the MDRD equation for patients that



   are not between the ages of 18 and 70, are pregnant, have



   extremes of body size, muscle mass, or nutritional status,



   or are non- or non-.



   According to the National Kidney Foundation, irrespective of



   diagnosis, the stage of the disease is based on the level of



   kidney function:



   Stage Description                      GFR(mL/min/1.73 m(2))



   1     Kidney damage with normal or decreased GFR       90



   2     Kidney damage with mild decrease in GFR          60-89



   3     Moderate decrease in GFR                         30-59



   4     Severe decrease in GFR                           15-29



   5     Kidney failure                       &lt;15 (or dialysis)

 

 77  Desirable &lt;150



   Borderline high 150-199



   High 200-499



   Very High &gt;500

 

 78  Desirable &lt;200



   Borderline high 200-239



   High &gt;239

 

 79  Low &lt;40



   Desirable: 40-60



   High: &gt;60

 

 80  Desirable: &lt;100 mg/dL



   Near Optimal: 100-129 mg/dL



   Borderline High: 130-159 mg/dL



   High: 160-189 mg/dL



   Very High: &gt;189 mg/dL

 

 81  please fax results to Dr. Murry [phone]

 

 82  Critical Result LACT:4.3 Called to YARITZA/REBEKAH at: 16:13:23 by:[id number] Read 
back



   by:YARITZA/REBEKAH

 

 83  Consistent with previous results on 11/11/15.

 

 84  The urine specimen was tested at the listed cutoffs:



   Drug class                       test level



   (ng/mL)



   Amphetamines                        500



   Barbituates                         200



   Benzodiazepine metabolites          200



   Cocaine metabolites                 150



   Cannabinoids                         50



   Opiates                             300



   Pcp                                  25



   



   This is a screening procedure. Positive results are not



   confirmed. Specimen was received without chain of custody.



   Results should be used for medical purposes only.

 

 85  *******Because ethnic data is not always readily available,



   this report includes an eGFR for both -Americans and



   non- Americans.****



   The National Kidney Disease Education Program (NKDEP) does



   not endorse the use of the MDRD equation for patients that



   are not between the ages of 18 and 70, are pregnant, have



   extremes of body size, muscle mass, or nutritional status,



   or are non- or non-.



   According to the National Kidney Foundation, irrespective of



   diagnosis, the stage of the disease is based on the level of



   kidney function:



   Stage Description                      GFR(mL/min/1.73 m(2))



   1     Kidney damage with normal or decreased GFR       90



   2     Kidney damage with mild decrease in GFR          60-89



   3     Moderate decrease in GFR                         30-59



   4     Severe decrease in GFR                           15-29



   5     Kidney failure                       &lt;15 (or dialysis)

 

 86  Therapeutic concentration: &lt;50 ug/mL



   Toxic concentration: &gt;120 ug/mL

 

 87  SEE RESULT BELOW



   -----------------------------------------------------------------------------
---------------



   Name:  VALERIE GRACIA                : 1980    Attend Dr: Falguni Montoya MD



   Acct:  [id number]  Unit: [id number]  AGE: 35            Location:  [facility]



   Re/11/15                        SEX: F             Status:    DEP ER



   -----------------------------------------------------------------------------
---------------



   



   SPEC: [id number]         LUIZ:   11/11/15-25    [facility] DR: Falguni Montoya MD



   REQ:  [id number]              RECD:   11/11/



   STATUS: RUTHIE MOREAU DR: Ryan Dsouza NP



   _



   SOURCE: URINE          [facility]:



   ORDERED:  Urine Culture



   



   -----------------------------------------------------------------------------
---------------



   Procedure                         Result                         Verified   
        Site



   -----------------------------------------------------------------------------
---------------



   Urine Culture  Final                                             11/13/15-
0859      ML



   



   Organism 1                     ESCHERICHIA COLI



   [place] Count                &gt;100,000 (Many) CFU/ML



   



   1. ESCHERICHIA COLI



   M.I.C.      RX



   --------- ------



   Ampicillin                     &lt;=2         S



   Cefazolin                      &lt;=4         S



   Cefepime                       &lt;=1         S



   Ceftriaxone                    &lt;=1         S



   Ciprofloxacin                  &lt;=0.25      S



   Gentamicin                     &lt;=1         S



   Levofloxacin                   &lt;=0.12      S



   Meropenem                      &lt;=0.25      S



   Nitrofurantoin                 &lt;=16        S



   Tetracycline                   &lt;=1         S



   Pipercillin/Tazobactam         &lt;=4         S



   Trimethoprim/Sulfamethoxazole  &lt;=20        S



   Amoxicillin/Clavulanic Acid    &lt;=2         S



   Aztreonam                      &lt;=1         S



   



   



   Contact the Microbiology Department for any additional



   antibiotic reporting.



   



   -----------------------------------------------------------------------------
---------------



   * ML - MAIN LAB ([facility])



   .



   



   



   



   ** END OF REPORT **



   



   * ML=Testing performed at Main Lab



   DEPARTMENT OF PATHOLOGY,  [address] [place] [address]



   Phone # [phone]      Fax #[phone]



   Андрей Jarquin M.D. Director     [facility] # [id number]

 

 88  *******Because ethnic data is not always readily available,



   this report includes an eGFR for both -Americans and



   non- Americans.****



   The National Kidney Disease Education Program (NKDEP) does



   not endorse the use of the MDRD equation for patients that



   are not between the ages of 18 and 70, are pregnant, have



   extremes of body size, muscle mass, or nutritional status,



   or are non- or non-.



   According to the National Kidney Foundation, irrespective of



   diagnosis, the stage of the disease is based on the level of



   kidney function:



   Stage Description                      GFR(mL/min/1.73 m(2))



   1     Kidney damage with normal or decreased GFR       90



   2     Kidney damage with mild decrease in GFR          60-89



   3     Moderate decrease in GFR                         30-59



   4     Severe decrease in GFR                           15-29



   5     Kidney failure                       &lt;15 (or dialysis)

 

 89  Consistent with previous results on 8/5/15.

 

 90  Normal Range 180 to 914



   Indeterminate Range 145 to 180



   Deficient Range  &lt;145

 

 91  Test Performed by:



   [place] [address]



   : Jesse Bernard II, M.D., Ph.D.

 

 92  -------------------REFERENCE VALUE--------------------------



   &lt;=20.0 (Negative)



   Test Performed by:



   [place] [address]



   : Jesse Bernard II, M.D., Ph.D.

 

 93  Effective immediately, due to a laboratory mean normal



   Protime change, the reference range for the INR has changed.

 

 94  **Please note:



   The following may produce a false positive D Dimer test:



   - Rheumatoid factor greater than 60 IU/ml



   - Plasma hemoglobin greater than 0.05 gm/dl



   - Bilirubin greater than 50 mg/dl



   - Lipids greater than 1000 mg/dl



   - FDP greater than 20 ug/ml

 

 95  *******Because ethnic data is not always readily available,



   this report includes an eGFR for both -Americans and



   non- Americans.****



   The National Kidney Disease Education Program (NKDEP) does



   not endorse the use of the MDRD equation for patients that



   are not between the ages of 18 and 70, are pregnant, have



   extremes of body size, muscle mass, or nutritional status,



   or are non- or non-.



   According to the National Kidney Foundation, irrespective of



   diagnosis, the stage of the disease is based on the level of



   kidney function:



   Stage Description                      GFR(mL/min/1.73 m(2))



   1     Kidney damage with normal or decreased GFR       90



   2     Kidney damage with mild decrease in GFR          60-89



   3     Moderate decrease in GFR                         30-59



   4     Severe decrease in GFR                           15-29



   5     Kidney failure                       &lt;15 (or dialysis)

 

 96  Reference Range and Interpretation:



   TnI (ng/mL)             Interpretation



   Less Than 0.03 ng/mL    Not supportive of diagnosis of MI



   0.03 - 0.50 ng/mL       Indeterminate: suggest serial



   studies if clinically indicated.



   Greater than 0.5 ng/mL  Consistent with diagnosis of MI

 

 97  [facility] Specimen Source: CERVICAL

 

 98  RUN DATE: 03/17/15               [facility] **LIVE**       
         PAGE    1



   RUN TIME: 1438             [address], [place]   [address]



   Specimen Inquiry



   



   -----------------------------------------------------------------------------
---------------



   Name:  VLAERIE GRACIA                : 1980    Attend Dr: Guerrero Nicolas MD



   Acct:  [id number]  Unit: [id number]  AGE: 34            Location:  [facility]



   Re/16/15                        SEX: F             Status:    DEP ER



   -----------------------------------------------------------------------------
---------------



   



   SPEC: [id number]         LUIZ:   03/16/    [facility] DR: Gladys ERAZO



   REQ:  [id number]              RECD:   03/16/



   STATUS: RUTHIE MOREAU DR: Guerrero Elliott NP



   _



   SOURCE: VAGINAL        SPDESC:



   ORDERED:  Yasemin,Yeast DNA



   



   -----------------------------------------------------------------------------
---------------



   Procedure                         Result                         Verified   
        Site



   -----------------------------------------------------------------------------
---------------



   Gardnerella/Yeast: Vaginal DNA  Final                            03/17/15-
1438      L



   



   Organism 1                     Negative Gardnerella



   Organism 2                     POSITIVE LAILA



   The presence of G. vaginalis, although suggestive, is not



   diagnostic for bacterial vaginosis.  Results should be



   interpreted in conjuction with other clinical and laboratory



   data available.



   



   Women with vaginal discharge should be evaluated for risk



   factors of cervicitis and pelvic inflammatory disease, toxic



   shock syndrome (S.aureus), and if present, evaluated for



   organisms not included in this assay such as N. gonorrhoeae,



   C. trachomatis, Mobiluncus, Mycoplasma and/or Prevotella.



   Mixed infections may occur.



   



   The performance of this test on patient specimens collected



   during or immediately after antimicrobial therapy is



   unknown. The presence or absence of Candida species, or G.



   vaginalis  cannot be used as a test for therapeutic success



   or failure.



   -----------------------------------------------------------------------------
---------------



   



   



   



   



   



   



   



   



   



   



   ** END OF REPORT **



   



   * ML=Testing performed at Main Lab



   DEPARTMENT OF PATHOLOGY,  [facility] [place], [place] [address]



   Phone # [phone]      Fax #[phone]



   Андрей Jarquin M.D. Director     [facility] # [id number]

 

 99  [facility] Specimen Source: CERVICAL



   Endocervical



   Endocervical

 

 10  Female urine specimens have been self-validated by [address] [facility] and have been granted conditional



   assay approval by [facility].

 

 10  -------------------ADDITIONAL INFORMATION-------------------



 1  Analyte Specific Reagent: This test was developed and its



   performance characteristics determined by [facility]. It



   has not been cleared or approved by the U.S. Food and Drug



   Administration.

 

 10  -------------------ADDITIONAL INFORMATION-------------------



 2  Laboratory developed test.



   Test Performed by:



   [facility] - [address] [address]



   : Jesse Bernard II, M.D., Ph.D.

 

 10  RUN DATE: 03/18/15               [facility] **LIVE**       
         PAGE    1



 3  RUN TIME: 1126             [facility] [employer] [place], [place]   [address]



   Specimen Inquiry



   



   -----------------------------------------------------------------------------
---------------



   Name:  VALERIE GRACIA                : 1980    Attend Dr: Guerrero Nicolas MD



   Acct:  [id number]  Unit: [id number]  AGE: 34            Location:  [facility]



   Re/16/15                        SEX: F             Status:    DEP ER



   -----------------------------------------------------------------------------
---------------



   



   SPEC: [id number]         LUIZ:   03/16/    [facility] DR: Gladys ERAZO



   REQ:  [id number]              RECD:   03/16/



   STATUS: COMP             OTHR DR: Guerrero Elliott NP



   _



   SOURCE: URINE          SPDESC:



   ORDERED:  Urine Culture



   QUERIES:  Provider Requisition # [id number]



   



   -----------------------------------------------------------------------------
---------------



   Procedure                         Result                         Verified   
        Site



   -----------------------------------------------------------------------------
---------------



   Urine Culture  Final                                             03/18/15-
1125      ML



   



   Organism 1                     NORMAL DONOVAN



   [place] Count                1-10,000 (Few) CFU/ML



   



   -----------------------------------------------------------------------------
---------------



   * ML - [facility] ([facility])



   .



   Андрей Soria M.D.



   



   



   



   



   



   



   



   



   



   



   



   



   



   



   



   



   



   



   



   



   



   



   ** END OF REPORT **



   



   * ML=Testing performed at Main Lab



   DEPARTMENT OF PATHOLOGY,  [facility] [place], [place] [address]



   Phone # [phone]      Fax #[phone]



   Андрей Jarquin M.D. Director     [facility] # [id number]

 

 10  Normal Range 180 to 914



 4  Indeterminate Range 145 to 180



   Deficient Range  &lt;145

 

 10  *******Because ethnic data is not always readily available,



 5  this report includes an eGFR for both -Americans and



   non- Americans.****



   The National Kidney Disease Education Program (NKDEP) does



   not endorse the use of the MDRD equation for patients that



   are not between the ages of 18 and 70, are pregnant, have



   extremes of body size, muscle mass, or nutritional status,



   or are non- or non-.



   According to the National Kidney Foundation, irrespective of



   diagnosis, the stage of the disease is based on the level of



   kidney function:



   Stage Description                      GFR(mL/min/1.73 m(2))



   1     Kidney damage with normal or decreased GFR       90



   2     Kidney damage with mild decrease in GFR          60-89



   3     Moderate decrease in GFR                         30-59



   4     Severe decrease in GFR                           15-29



   5     Kidney failure                       &lt;15 (or dialysis)

 

 10  RUN DATE: 14               [facility] **LIVE**       
         PAGE    1



 6  RUN TIME: 1218             [facility] [employer] [place], [place]   [address]



   Specimen Inquiry



   



   -----------------------------------------------------------------------------
---------------



   Name:  VALERIE GRACIA                : 1980    Attend Dr: Jessy Elliott NP



   Acct:  [id number]  Unit: [id number]  AGE: 34            Location:  [facility]



   Re14                        SEX: F             Status:    REG REF



   -----------------------------------------------------------------------------
---------------



   



   SPEC: [id number]         LUIZ:       SUBM DR: Jessy Elliott NP



   REQ:  [id number]              RECD:   14-



   STATUS: COMP



   _



   SOURCE: URINE          SPDESC:



   ORDERED:  Urine Culture



   QUERIES:  [facility] Number [id number]



   



   -----------------------------------------------------------------------------
---------------



   Procedure                         Result                         Verified   
        Site



   -----------------------------------------------------------------------------
---------------



   Urine Culture  Final                                             14-
1217      ML



   



   Organism 1                     NORMAL DONOVAN



   [place] Count                1-10,000 (Few) CFU/ML



   



   -----------------------------------------------------------------------------
---------------



   



   



   



   



   



   



   



   



   



   



   



   



   



   



   



   



   



   



   



   



   



   



   



   



   



   



   ** END OF REPORT **



   



   * ML=Testing performed at Main Lab



   DEPARTMENT OF PATHOLOGY,  [address] [place] [address]



   Phone # [phone]      Fax #[phone]



   Андрей Jarquin M.D. Director     [facility] # [id number]

 

 10  Comment: s



 7  

 

 10  Comment: s



 8  

 

 10  Reference Range and Interpretation:



 9  TnI (ng/mL)             Interpretation



   Less Than 0.03 ng/mL    Not supportive of diagnosis of MI



   0.03 - 0.50 ng/mL       Indeterminate: suggest serial



   studies if clinically indicated.



   Greater than 0.5 ng/mL  Consistent with diagnosis of MI

 

 11  Therapeutic concentration: &lt;50 ug/mL



 0  Toxic concentration: &gt;120 ug/mL

 

 11  Comment: s



 1  

 

 11  Comment: s



 2  

 

 11  *******Because ethnic data is not always readily available,



 3  this report includes an eGFR for both -Americans and



   non- Americans.****



   The National Kidney Disease Education Program (NKDEP) does



   not endorse the use of the MDRD equation for patients that



   are not between the ages of 18 and 70, are pregnant, have



   extremes of body size, muscle mass, or nutritional status,



   or are non- or non-.



   According to the National Kidney Foundation, irrespective of



   diagnosis, the stage of the disease is based on the level of



   kidney function:



   Stage Description                      GFR(mL/min/1.73 m(2))



   1     Kidney damage with normal or decreased GFR       90



   2     Kidney damage with mild decrease in GFR          60-89



   3     Moderate decrease in GFR                         30-59



   4     Severe decrease in GFR                           15-29



   5     Kidney failure                       &lt;15 (or dialysis)

 

 11  **Please note:



 4  The following may produce a false positive D Dimer test:



   - Rheumatoid factor greater than 60 IU/ml



   - Plasma hemoglobin greater than 0.05 gm/dl



   - Bilirubin greater than 50 mg/dl



   - Lipids greater than 1000 mg/dl



   - FDP greater than 20 ug/ml

 

 11  This test detects intact HCG only and is indicated for the



 5  early detection of pregnancy.

 

 11  Comment: d



 6  

 

 11  The urine specimen was tested at the listed cutoffs:



 7  Drug class                       test level



   (ng/ml)



   Amphetamines                        300



   Barbituates                         200



   Benzodiazepine metabolites          200



   Cocaine metabolites                 300



   Cannabinoids                         25



   Opiates                             200



   Pcp                                  25



   



   This is a screening procedure. Positive results are not



   confirmed. Specimen was received without chain of custody.



   Results should be used for medical purposes only.

 

 11  RUN DATE: 12/10/13               [facility] **LIVE**       
         PAGE    1



 8  RUN TIME: 1536             [address] [place] [address]   [address]



   Specimen Inquiry



   



   -----------------------------------------------------------------------------
---------------



   Name:  VALERIE GRACIA                : 1980    Attend Dr: 
Lissy Lr MD



   Acct:  [id number]  Unit: [id number]  AGE: 33            Location:  [facility]



   Re13                        SEX: F             Status:    REG REF



   -----------------------------------------------------------------------------
---------------



   



   SPEC: [id number]            LUIZ:       [facility] DR: Lissy Lr MD



   REQ:  [id number]             RECD: 



   STATUS: SOUT



   _



   ORDERED:  IMAGE ANALYSIS, PAP SM PATH REV, HPV/Thin Prep



   



   FINAL DIAGNOSIS



   



   Negative for Intraepithelial lesion or Malignancy



   Reactive cellular changes associated with



   Inflammation (includes typical repair)



   COMMENTS:



   Specimen sent to [facility] in [place], [place] on 12/10/13 by [id number] at 1327.



   Results will be reported separately.



   



   



   



   A. Ectocervical/Endocervical



   Specimen Adequacy:



   Satisfactory of evaluation



   Transformation zone component identified



   Predominance of white blood cells



   Patient Information:



   HPV: High risk HPV DNA testing regardless of pap results.



   Actual Specimen Date:         13



   Last Menstrual Date:          13



   



   Signed __________(signature on file)___________ Irish Johnson MD 12/
10/13 1536



   



   This Pap test was evaluated with the assistance of the ThinPrep Test Imaging 
System. Due to



   cytologic findings at the imager microscope, comprehensive manual 
rescreening by a



   Cytotechnologist may be required.



   The Pap Smear is a screening test designed to aid in the detection of 
premalignant and



   malignant conditions of the uterine cervix.  It is not a diagnostic 
procedure and should



   not be used as the sole means of detecting cervical cancer.  Both false-
positive and false-



   negative reports do occur.  Depending on your risk status, a Pap smear 
shoudl be obtained



   and evaluated every 1-3 years.



   



   -----------------------------------------------------------------------------
---------------



   



   



   ** END OF REPORT **



   



   * ML=Testing performed at Main Lab



   DEPARTMENT OF PATHOLOGY,  [facility] [place], [place] [address]



   Phone # [phone]      Fax #[phone]



   Андрей Jarquin M.D. Director     [place] [employer]  #[id number]

 

 11  RUN DATE: 12/10/13               [facility] **LIVE**       
         PAGE    1



 9  RUN TIME: 6854             [facility] [employer] [place], [place]   [address]



   Specimen Inquiry



   



   -----------------------------------------------------------------------------
---------------



   Name:  VALERIE GRACIA                : 1980    Attend Dr: 
Lissy Lr MD



   Acct:  [id number]  Unit: [id number]  AGE: 33            Location:  [facility]



   Re13                        SEX: F             Status:    REG REF



   -----------------------------------------------------------------------------
---------------



   



   SPEC: [id number]         LUIZ:       SUBM DR: Lissy Lr MD



   REQ:  [id number]              RECD:   



   STATUS: COMP



   _



   SOURCE: THIN           [facility]:



   ORDERED:  GC/Chlam RNA



   QUERIES:  [employer] Number [id number]



   



   -----------------------------------------------------------------------------
---------------



   Procedure                         Result                         Verified   
        Site



   -----------------------------------------------------------------------------
---------------



   Chlamydia Trachomatis RNA  Final                                 12/10/13-
1422      ML



   NEGATIVE for Chlamydia trachomatis rRNA



   



   GC (N. gonorrhoeae) RNA  Final                                   12/10/13-
1426      ML



   NEGATIVE for Neisseria gonorrhoeae rRNA



   



   A negative result does not preclude the presence of a C.



   trachomatis or N. gonorrhoeae infection because results are



   dependent on adequate specimen collection, absence of



   inhibitors, and sufficient rRNA to be detected. Test results



   may be affected by improper specimen collection, improper



   storage, technical error, or specimen mixup.



   



   Limitations of the Procedure:



   -----------------------------------------------------------



   The Aptima Combo 2 Assay is not intended for the evaluation



   of suspected sexual abuse or for other medico-legal



   indications. For those patients for whom a false positive



   result may have adverse psychosocial impact, the [facility]



   recommends retesting by a method using an alternate



   technology.



   



   Therapeutic failure or success cannot be determined with the



   Aptima Combo 2 Assay since nucleic acid may persist



   following appropriate antimicrobial therapy.



   



   Results from the Aptima Combo 2 Assay should be interpreted



   in conjunction with other laboratory and clinical data



   available to the clinican.



   



   



   ** CONTINUED ON NEXT PAGE **



   



   * ML=Testing performed at Main Lab



   DEPARTMENT OF PATHOLOGY,  [facility] [place] [address]



   Phone # [phone]      Fax #[phone]



   Андрей Jarquin M.D. Director     [place] [employer]  #[id number]



   



   



   RUN DATE: 12/10/13               [facility] **LIVE**         
       PAGE    2



   RUN TIME: 3692             [facility] [employer] [place], [place]   [address]



   Specimen Inquiry



   



   -----------------------------------------------------------------------------
---------------



   Patient: BASILVALERIE                 [id number]     (Continued)



   -----------------------------------------------------------------------------
---------------



   



   



   Specimen: [id number]    Collected:   Received: 
    (Continued)



   



   -----------------------------------------------------------------------------
---------------



   Procedure                         Result                         Verified   
        Site



   -----------------------------------------------------------------------------
---------------



   GC (N. gonorrhoeae) RNA  Final   (continued)                     12/10/13-
1426



   Performance characteristics for detecting C. trachomatis and



   N. gonorrhoeae are derived from high prevalence populations.



   Positive results in low prevalence populations should be



   interpreted carefully with the understanding that the



   likelihood of a false positive may be higher than a true



   positive.



   



   -----------------------------------------------------------------------------
---------------



   



   



   



   



   



   



   



   



   



   



   



   



   



   



   



   



   



   



   



   



   



   



   



   



   



   



   



   



   



   



   ** END OF REPORT **



   



   * ML=Testing performed at Main Lab



   DEPARTMENT OF PATHOLOGY,  [facility] [place], [place] [address]



   Phone # [phone]      Fax #[phone]



   Андрей Jarquin M.D. Director     [place] [employer]  #[id number]

 

 12  RUN DATE: 12/10/13               [facility] **LIVE**       
         PAGE    1



 0  RUN TIME: 1200             [address] [place] [address]   [address]



   Specimen Inquiry



   



   -----------------------------------------------------------------------------
---------------



   Name:  VALERIE GRACIA                : 1980    Attend Dr: 
Lissy Lr MD



   Acct:  [id number]  Unit: [id number]  AGE: 33            Location:  [facility]



   Re13                        SEX: F             Status:    REG REF



   -----------------------------------------------------------------------------
---------------



   



   SPEC: [id number]         LUIZ:       SUBM DR: Lissy Lr MD



   REQ:  [id number]              RECD:   



   STATUS: COMP



   _



   SOURCE: VAGINAL        SPDESC:



   ORDERED:  Affirm



   QUERIES:  [employer] Number [id number]



   



   -----------------------------------------------------------------------------
---------------



   Procedure                         Result                         Verified   
        Site



   -----------------------------------------------------------------------------
---------------



   Affirm Vaginal DNA Probe  Final                                  12/10/13-
1200      ML



   



   Organism 1                     Negative Trichomonas



   Organism 2                     Negative Gardnerella



   Organism 3                     Negative Candida



   



   The presence of G. vaginalis, although suggestive, is not



   diagnostic for bacterial vaginosis. Results should be



   interpreted in conjunction with other clinical and



   laboratory data available.



   



   Women with vaginal discharge should be evaluated for risk



   factors of cervicitis and pelvic inflammatory disease, toxic



   shock syndrome (S.aureus), and if present, evaluated for



   organisms not included in this assay such as N. gonorrhoeae,



   C. trachomatis, Mobiluncus, Mycoplasma and/or Prevotella.



   Mixed infections may occur.



   



   The performance of this test on patient specimens collected



   during or immediately after antimicrobial therapy is



   unknown. The presence or absence of Candida species, G.



   vaginalis or T. vaginalis cannot be used as a test for



   therapeutic success or failure.



   



   -----------------------------------------------------------------------------
---------------



   



   



   



   



   



   



   



   ** END OF REPORT **



   



   * ML=Testing performed at Main Lab



   DEPARTMENT OF PATHOLOGY,  [address] [place] [address]



   Phone # [phone]      Fax #[phone]



   Андрей Jarquin M.D. Director     [place] [employer]  #[id number]

 

 12  RESULT: Ectocervical/Endocervical



 1  

 

 12  For types 16, 18, 31, 33, 35, 39, 45, 51, 52, 56, 58, 59



 2  and 68.



   Test Performed by:



   [address] [address]



   : Pillo Serra III, M.D.

 

 12  The performance of this assay has not been established



 3  for use in neonates, infants or on cord blood.



   Test Performed by:



   [address] [address]



   : Pillo Serra III, M.D.

 

 12  Test Performed by:



 4  [address] [address]



   : Pillo Serra III, M.D.

 

 12  RUN DATE: 13               [facility] **LIVE**       
         PAGE    1



 5  RUN TIME: 6586             [address] [place] [address]   [address]



   Specimen Inquiry



   



   -----------------------------------------------------------------------------
---------------



   Name:  VALERIE GRACIA                : 1980    Attend Dr: 
Lissy Lr MD



   Acct:  [id number]  Unit: [id number]  AGE: 33            Location:  [facility]



   Re13                        SEX: F             Status:    REG REF



   -----------------------------------------------------------------------------
---------------



   



   SPEC: [id number]         LUIZ:       [facility] DR: Lissy Lr MD



   REQ:  [id number]              RECD:   



   STATUS: RES



   _



   SOURCE: STOOL          SPDESC:



   ORDERED:  Hemoccult, Stool Culture, Fecal Lactoferr, C. diff Amp DNA, O P: 
Giar/Dean



   QUERIES:  [employer] Number [id number]



   



   -----------------------------------------------------------------------------
---------------



   Procedure                         Result                         Verified   
        Site



   -----------------------------------------------------------------------------
---------------



   Stool Culture



   PENDING



   



   Stool Specimen Description  Final                                13-
1156      ML



   Stool Color                 Brown



   Stool Form                  Semi-formed



   Stool Consistency           Soft



   



   Shiga Toxin 1   2



   PENDING



   



   C. difficile Amplified DNA  Final                                13-
1352      ML



   



   Organism 1                     Neg: No C. difficile detected



   



   Assay tests for toxigenic C. difficile with Pathogen Locus



   (PALOC)



   



   TEST LIMITATIONS:



   



   Assay does not distinguish between viable and nonviable



   organisms. Test results are to be used in conjunction with



   information available from the patient clinical evaluation



   and other diagnostic procedures.  Two distinct groups have



   been identified that can harbor C. difficile



   asymptomatically at very high rates. Colonization at rates



   up to 50% and higher have been reported in infants and rates



   up to 32% in cystic fibrosis patients.



   



   



   



   ** CONTINUED ON NEXT PAGE **



   



   * ML=Testing performed at Main Lab



   DEPARTMENT OF PATHOLOGY,  [facility] [place] [address]



   Phone # [phone]      Fax #[phone]



   Андрей Jarquin M.D. Director     [place] Permit  #[id number]



   



   



   RUN DATE: 13               [facility] **LIVE**         
       PAGE    2



   RUN TIME: 752             [address] [place] [address]   [address]



   Specimen Inquiry



   



   -----------------------------------------------------------------------------
---------------



   Patient: VALERIE GRACIA                 [id number]     (Continued)



   -----------------------------------------------------------------------------
---------------



   



   



   Specimen: [id number]    Collected:   Received: 
    (Continued)



   



   -----------------------------------------------------------------------------
---------------



   Procedure                         Result                         Verified   
        Site



   -----------------------------------------------------------------------------
---------------



   C. difficile Amplified DNA  Final   (continued)                  13-
1352



   Fecal Lactoferrin (Stool WBC)  Final                             13-
1315      ML



   Fecal Lactoferrin           Negative by Immunoassay



   



   TEST LIMITATIONS:



   



   Assay detects elevated levels of lactoferrin released from



   fecal leukocytes as a marker of intestinal inflammation. The



   test may not be appropriate in immunocompromised persons.



   Fecal samples from breast fed infants should not be used



   with this assay.



   



   Stool Occult Blood  Final                                        13-
0752      ML



   Stool Occult Blood          Negative



   



   O P: Giardia/Cryptospor Screen



   PENDING



   



   -----------------------------------------------------------------------------
---------------



   



   



   



   



   



   



   



   



   



   



   



   



   



   



   



   



   



   



   



   



   ** END OF REPORT **



   



   * ML=Testing performed at Main Lab



   DEPARTMENT OF PATHOLOGY,  [address] [place] [address]



   Phone # [phone]      Fax #[phone]



   Андрей Jarquin M.D. Director     [place] [employer] Permit  #[id number]

 

 12  RUN DATE: 13               [facility] **LIVE**       
         PAGE    1



 6  RUN TIME: 4425             [address] [place] [address]   [address]



   Specimen Inquiry



   



   -----------------------------------------------------------------------------
---------------



   Name:  VALERIE GRACIA                : 1980    Attend Dr: 
Lissy Lr MD



   Acct:  [id number]  Unit: [id number]  AGE: 33            Location:  [facility]



   Re13                        SEX: F             Status:    REG REF



   -----------------------------------------------------------------------------
---------------



   



   SPEC: [id number]         LUIZ:       [facility] DR: Lissy Lr MD



   REQ:  [id number]              RECD:   



   STATUS: COMP



   _



   SOURCE: STOOL          SPDESC:



   ORDERED:  Hemoccult, Stool Culture, Fecal Lactoferr, C. diff Amp DNA, O P: 
Giar/Dean



   QUERIES:  [employer] Number [id number]



   



   -----------------------------------------------------------------------------
---------------



   Procedure                         Result                         Verified   
        Site



   -----------------------------------------------------------------------------
---------------



   Stool Culture  Final                                             13-
1016      ML



   



   Result                      No enteric pathogens isolated



   



   Testing for Salmonella, Shigella, Aeromonas, Plesiomonas,



   Yersinia and Campylobacter are included in a Stool Culture.



   



   Vibrio spp not routinely tested for in a stool culture.



   If testing is desired, please request specifically when



   placing test order.



   



   Sensitivities not routinely performed on stool isolates, as



   antibiotics may prolong the carriage rate of bacteria.



   Please contact the microbiology lab if sensitivities are



   required.



   



   Stool Specimen Description  Final                                13-
1156      ML



   Stool Color                 Brown



   Stool Form                  Semi-formed



   Stool Consistency           Soft



   



   Shiga Toxin 1   2  Final                                         13-
1231      ML



   



   Organism 1                     Negative Shiga Toxin 1   2



   



   Immunochromatographic Assay



   



   



   



   



   



   ** CONTINUED ON NEXT PAGE **



   



   * ML=Testing performed at Main Lab



   DEPARTMENT OF PATHOLOGY,  [facility] [place] [address]



   Phone # [phone]      Fax #[phone]



   Андрей Jarquin M.D. Director     [place] [employer] Permit  #[id number]



   



   



   RUN DATE: 13               [facility] **LIVE**         
       PAGE    2



   RUN TIME: 1358             [facility] [place], [place]   [address]



   Specimen Inquiry



   



   -----------------------------------------------------------------------------
---------------



   Patient: VALERIE GRACIA                 [id number]     (Continued)



   -----------------------------------------------------------------------------
---------------



   



   



   Specimen: [id number]    Collected:   Received: 
    (Continued)



   



   -----------------------------------------------------------------------------
---------------



   Procedure                         Result                         Verified   
        Site



   -----------------------------------------------------------------------------
---------------



   Shiga Toxin 1   2  Final   (continued)                           13-
1231



   C. difficile Amplified DNA  Final                                13-
1352      ML



   



   Organism 1                     Neg: No C. difficile detected



   



   Assay tests for toxigenic C. difficile with Pathogen Locus



   (PALOC)



   



   TEST LIMITATIONS:



   



   Assay does not distinguish between viable and nonviable



   organisms. Test results are to be used in conjunction with



   information available from the patient clinical evaluation



   and other diagnostic procedures.  Two distinct groups have



   been identified that can harbor C. difficile



   asymptomatically at very high rates. Colonization at rates



   up to 50% and higher have been reported in infants and rates



   up to 32% in cystic fibrosis patients.



   



   Fecal Lactoferrin (Stool WBC)  Final                             13-
1315      ML



   Fecal Lactoferrin           Negative by Immunoassay



   



   TEST LIMITATIONS:



   



   Assay detects elevated levels of lactoferrin released from



   fecal leukocytes as a marker of intestinal inflammation. The



   test may not be appropriate in immunocompromised persons.



   Fecal samples from breast fed infants should not be used



   with this assay.



   



   Stool Occult Blood  Final                                        13-
0752      ML



   Stool Occult Blood          Negative



   



   O P: Giardia/Cryptospor Screen  Final                            13-
1358      ML



   



   Organism 1                     Negative Cryptosporidium



   Organism 2                     Negative Giardia



   



   ** CONTINUED ON NEXT PAGE **



   



   * ML=Testing performed at Main Lab



   DEPARTMENT OF PATHOLOGY,  [facility] [place], [place] [address]



   Phone # [phone]      Fax #[phone]



   Андрей Jarquin M.D. Director     [place] [employer] Permit  #[id number]



   



   



   RUN DATE: 13               [facility] **LIVE**         
       PAGE    3



   RUN TIME: 6421             [address] [place] [address]   [address]



   Specimen Inquiry



   



   -----------------------------------------------------------------------------
---------------



   Patient: VALERIE GRACIA                 [id number]     (Continued)



   -----------------------------------------------------------------------------
---------------



   



   



   Specimen: [id number]    Collected:   Received: 13-
    (Continued)



   



   -----------------------------------------------------------------------------
---------------



   Procedure                         Result                         Verified   
        Site



   -----------------------------------------------------------------------------
---------------



   O P: Giardia/Cryptospor Screen  Final   (continued)              13-
1358



   Giardia and cryptosporidium antigen testing performed by



   enzyme immunoassay.  If patient is immunocompromised or has



   traveled to or is from a developing country, a full ova and



   parasite exam with microscopic (OPMIC) is recommended.  All



   samples will be held one month in case full ova and parasite



   testing is requested.  Contact the Microbiology Department



   at [phone].



   



   TEST LIMITATIONS:



   As with all diagnostic procedures, the results obtained



   should be used in conjunction with other clinical



   information available the physician.  Negative results can



   occur in samples containing antigen below lower limits of



   detection of the assay.  The use of colonic washes,



   aspirates or other diluted sample types has not been



   established and could affect the performance of the assay.



   Stool samples contaminated with an oily or particulate base



   (eg. Barium, mineral oil etc.) could interfere with the test



   and are not recommended.



   



   -----------------------------------------------------------------------------
---------------



   



   



   



   



   



   



   



   



   



   



   



   



   



   



   



   



   



   ** END OF REPORT **



   



   * ML=Testing performed at Main Lab



   DEPARTMENT OF PATHOLOGY,  [facility] [place], [place] [address]



   Phone # [phone]      Fax #[phone]



   Андрей Jarquin M.D. Director     [place] Permit  #[id number]

 

 12  RUN DATE: 13               [facility] **LIVE**       
         PAGE    1



 7  RUN TIME: 1232             [facility] [employer] [place], [place]   [address]



   Specimen Inquiry



   



   -----------------------------------------------------------------------------
---------------



   Name:  VALERIE GRACIA                : 1980    Attend Dr: 
Lissy Lr MD



   Acct:  [id number]  Unit: [id number]  AGE: 33            Location:  [facility]



   Re13                        SEX: F             Status:    REG REF



   -----------------------------------------------------------------------------
---------------



   



   SPEC: [id number]         LUIZ:       [facility] DR: Lissy Lr MD



   REQ:  [id number]              RECD:   



   STATUS: RES



   _



   SOURCE: STOOL          SPDESC:



   ORDERED:  Hemoccult, Stool Culture, Fecal Lactoferr, C. diff Amp DNA, O P: 
Giar/Dean



   QUERIES:  [employer] Number [id number]



   



   -----------------------------------------------------------------------------
---------------



   Procedure                         Result                         Verified   
        Site



   -----------------------------------------------------------------------------
---------------



   Stool Culture  Final                                             13-
1016      ML



   



   Result                      No enteric pathogens isolated



   



   Testing for Salmonella, Shigella, Aeromonas, Plesiomonas,



   Yersinia and Campylobacter are included in a Stool Culture.



   



   Vibrio spp not routinely tested for in a stool culture.



   If testing is desired, please request specifically when



   placing test order.



   



   Sensitivities not routinely performed on stool isolates, as



   antibiotics may prolong the carriage rate of bacteria.



   Please contact the microbiology lab if sensitivities are



   required.



   



   Stool Specimen Description  Final                                13-
1156      ML



   Stool Color                 Brown



   Stool Form                  Semi-formed



   Stool Consistency           Soft



   



   Shiga Toxin 1   2  Final                                         13-
1231      ML



   



   Organism 1                     Negative Shiga Toxin 1   2



   



   Immunochromatographic Assay



   



   



   



   



   



   ** CONTINUED ON NEXT PAGE **



   



   * ML=Testing performed at Main Lab



   DEPARTMENT OF PATHOLOGY,  [facility] [place], [place] [address]



   Phone # [phone]      Fax #[phone]



   Андрей Jarquin M.D. Director     [place] Permit  #[id number]



   



   



   RUN DATE: 13               [facility] **LIVE**         
       PAGE    2



   RUN TIME: 1232             [facility] [place], [place]   [address]



   Specimen Inquiry



   



   -----------------------------------------------------------------------------
---------------



   Patient: VALERIE GRACIA                 [id number]     (Continued)



   -----------------------------------------------------------------------------
---------------



   



   



   Specimen: [id number]    Collected:   Received: 1008
    (Continued)



   



   -----------------------------------------------------------------------------
---------------



   Procedure                         Result                         Verified   
        Site



   -----------------------------------------------------------------------------
---------------



   Shiga Toxin 1   2  Final   (continued)                           13-
1231



   C. difficile Amplified DNA  Final                                13-
1352      ML



   



   Organism 1                     Neg: No C. difficile detected



   



   Assay tests for toxigenic C. difficile with Pathogen Locus



   (PALOC)



   



   TEST LIMITATIONS:



   



   Assay does not distinguish between viable and nonviable



   organisms. Test results are to be used in conjunction with



   information available from the patient clinical evaluation



   and other diagnostic procedures.  Two distinct groups have



   been identified that can harbor C. difficile



   asymptomatically at very high rates. Colonization at rates



   up to 50% and higher have been reported in infants and rates



   up to 32% in cystic fibrosis patients.



   



   Fecal Lactoferrin (Stool WBC)  Final                             13-
1315      ML



   Fecal Lactoferrin           Negative by Immunoassay



   



   TEST LIMITATIONS:



   



   Assay detects elevated levels of lactoferrin released from



   fecal leukocytes as a marker of intestinal inflammation. The



   test may not be appropriate in immunocompromised persons.



   Fecal samples from breast fed infants should not be used



   with this assay.



   



   Stool Occult Blood  Final                                        13-
0752      ML



   Stool Occult Blood          Negative



   



   O P: Giardia/Cryptospor Screen



   PENDING



   



   -----------------------------------------------------------------------------
---------------



   



   



   ** END OF REPORT **



   



   * ML=Testing performed at Main Lab



   DEPARTMENT OF PATHOLOGY,  [address] [place] [address]



   Phone # [phone]      Fax #[phone]



   Андрей Jarquin M.D. Director     [place] Perm[employer]  #[id number]

 

 12  RUN DATE: 13               [facility] **LIVE**       
         PAGE    1



 8  RUN TIME: 1353             [address] [place] [address]   [address]



   Specimen Inquiry



   



   -----------------------------------------------------------------------------
---------------



   Name:  VALERIE GRACIA                : 1980    Attend Dr: 
Lissy Lr MD



   Acct:  [id number]  Unit: [id number]  AGE: 33            Location:  [facility]



   Re13                        SEX: F             Status:    REG REF



   -----------------------------------------------------------------------------
---------------



   



   SPEC: [id number]         LUIZ:       [facility] DR: Lissy Lr MD



   REQ:  [id number]              RECD:   



   STATUS: RES



   _



   SOURCE: STOOL          SPDESC:



   ORDERED:  Hemoccult, Stool Culture, Fecal Lactoferr, C. diff Amp DNA, O P: 
Giar/Dean



   QUERIES:  [employer] Number [id number]



   



   -----------------------------------------------------------------------------
---------------



   Procedure                         Result                         Verified   
        Site



   -----------------------------------------------------------------------------
---------------



   Stool Culture



   PENDING



   



   Stool Specimen Description  Final                                13-
1156      ML



   Stool Color                 Brown



   Stool Form                  Semi-formed



   Stool Consistency           Soft



   



   Shiga Toxin 1   2



   PENDING



   



   C. difficile Amplified DNA  Final                                13-
1352      ML



   



   Organism 1                     Neg: No C. difficile detected



   



   Assay tests for toxigenic C. difficile with Pathogen Locus



   (PALOC)



   



   TEST LIMITATIONS:



   



   Assay does not distinguish between viable and nonviable



   organisms. Test results are to be used in conjunction with



   information available from the patient clinical evaluation



   and other diagnostic procedures.  Two distinct groups have



   been identified that can harbor C. difficile



   asymptomatically at very high rates. Colonization at rates



   up to 50% and higher have been reported in infants and rates



   up to 32% in cystic fibrosis patients.



   



   



   



   ** CONTINUED ON NEXT PAGE **



   



   * ML=Testing performed at Main Lab



   DEPARTMENT OF PATHOLOGY,  [facility] [place], [place] [address]



   Phone # [phone]      Fax #[phone]



   Андрей Jarquin M.D. Director     [place] Permit  #[id number]



   



   



   RUN DATE: 13               [facility] **LIVE**         
       PAGE    2



   RUN TIME: 6737             [facility] [place], [place]   [address]



   Specimen Inquiry



   



   -----------------------------------------------------------------------------
---------------



   Patient: VALERIE GRACIA                 [id number]     (Continued)



   -----------------------------------------------------------------------------
---------------



   



   



   Specimen: [id number]    Collected:   Received: 13-
    (Continued)



   



   -----------------------------------------------------------------------------
---------------



   Procedure                         Result                         Verified   
        Site



   -----------------------------------------------------------------------------
---------------



   C. difficile Amplified DNA  Final   (continued)                  13-
1352



   Fecal Lactoferrin (Stool WBC)  Final                             13-
1315      ML



   Fecal Lactoferrin           Negative by Immunoassay



   



   TEST LIMITATIONS:



   



   Assay detects elevated levels of lactoferrin released from



   fecal leukocytes as a marker of intestinal inflammation. The



   test may not be appropriate in immunocompromised persons.



   Fecal samples from breast fed infants should not be used



   with this assay.



   



   Stool Occult Blood



   PENDING



   



   O P: Giardia/Cryptospor Screen



   PENDING



   



   -----------------------------------------------------------------------------
---------------



   



   



   



   



   



   



   



   



   



   



   



   



   



   



   



   



   



   



   



   



   ** END OF REPORT **



   



   * ML=Testing performed at Main Lab



   DEPARTMENT OF PATHOLOGY,  [facility] [place], [place] [address]



   Phone # [phone]      Fax #[phone]



   Андрей Jarquin M.D. Director     [place] [employer]  #[id number]

 

 12  RUN DATE: 13               [facility] **LIVE**       
         PAGE    1



 9  RUN TIME: 1315             [facility] [employer] [place], [place]   [address]



   Specimen Inquiry



   



   -----------------------------------------------------------------------------
---------------



   Name:  VALERIE GRACIA                : 1980    Attend Dr: 
Lissy Lr MD



   Acct:  [id number]  Unit: [id number]  AGE: 33            Location:  [facility]



   Re13                        SEX: F             Status:    REG REF



   -----------------------------------------------------------------------------
---------------



   



   SPEC: [id number]         LUIZ:       [facility] DR: Lissy Lr MD



   REQ:  [id number]              RECD:   



   STATUS: RES



   _



   SOURCE: STOOL          SPDESC:



   ORDERED:  Hemoccult, Stool Culture, Fecal Lactoferr, C. diff Amp DNA, O P: 
Giar/Dean



   QUERIES:  [employer] Number [id number]



   



   -----------------------------------------------------------------------------
---------------



   Procedure                         Result                         Verified   
        Site



   -----------------------------------------------------------------------------
---------------



   Stool Culture



   PENDING



   



   Stool Specimen Description  Final                                13-
1156      ML



   Stool Color                 Brown



   Stool Form                  Semi-formed



   Stool Consistency           Soft



   



   Shiga Toxin 1   2



   PENDING



   



   C. difficile Amplified DNA



   PENDING



   



   Fecal Lactoferrin (Stool WBC)  Final                             13-
1315      ML



   Fecal Lactoferrin           Negative by Immunoassay



   



   TEST LIMITATIONS:



   



   Assay detects elevated levels of lactoferrin released from



   fecal leukocytes as a marker of intestinal inflammation. The



   test may not be appropriate in immunocompromised persons.



   Fecal samples from breast fed infants should not be used



   with this assay.



   



   Stool Occult Blood



   PENDING



   



   O P: Giardia/Cryptospor Screen



   PENDING



   



   -----------------------------------------------------------------------------
---------------



   



   ** END OF REPORT **



   



   * ML=Testing performed at Main Lab



   DEPARTMENT OF PATHOLOGY,  [facility] [place], [place] [address]



   Phone # [phone]      Fax #[phone]



   Андрей Jarquin M.D. Director     [place] Permit  #[id number]

 

 13  RUN DATE: 13               [facility] LAB **LIVE**       
         PAGE    1



 0  RUN TIME: 1022             [facility] [place], [place]   [address]



   Specimen Inquiry



   



   -----------------------------------------------------------------------------
---------------



   Name:  VALERIE GRACIA                : 1980    Attend Dr: 
Lissy Lr MD



   Acct:  [id number]  Unit: [id number]  AGE: 33            Location:  [facility]



   Re13                        SEX: F             Status:    REG REF



   -----------------------------------------------------------------------------
---------------



   



   SPEC: [id number]         LUIZ:       SUBM DR: Lissy Lr MD



   REQ:  [id number]              RECD:   



   STATUS: COMP



   _



   SOURCE: URINE          SPDESC:



   ORDERED:  Urine Culture



   QUERIES:  Medent Number [id number]



   



   -----------------------------------------------------------------------------
---------------



   Procedure                         Result                         Verified   
        Site



   -----------------------------------------------------------------------------
---------------



   Urine Culture  Final                                             13-
1022      ML



   



   Organism 1                     NORMAL DONOVAN



   [place] Count                1-10,000 (Few) CFU/ML



   



   -----------------------------------------------------------------------------
---------------



   



   



   



   



   



   



   



   



   



   



   



   



   



   



   



   



   



   



   



   



   



   



   



   



   



   



   ** END OF REPORT **



   



   * ML=Testing performed at Main Lab



   DEPARTMENT OF PATHOLOGY,  [facility] [address] [place], [place] [address]



   Phone # [phone]      Fax #[phone]



   Андрей Jarquin M.D. Director     [place] Permit  #[id number]

 

 13  *******Because ethnic data is not always readily available,



 1  this report includes an eGFR for both -Americans and



   non- Americans.****



   The National Kidney Disease Education Program (NKDEP) does



   not endorse the use of the MDRD equation for patients that



   are not between the ages of 18 and 70, are pregnant, have



   extremes of body size, muscle mass, or nutritional status,



   or are non- or non-.



   According to the National Kidney Foundation, irrespective of



   diagnosis, the stage of the disease is based on the level of



   kidney function:



   Stage Description                      GFR(mL/min/1.73 m(2))



   1     Kidney damage with normal or decreased GFR       90



   2     Kidney damage with mild decrease in GFR          60-89



   3     Moderate decrease in GFR                         30-59



   4     Severe decrease in GFR                           15-29



   5     Kidney failure                       &lt;15 (or dialysis)

 

 13  RUN DATE: 13               [facility] LAB **LIVE**       
         PAGE    1



 2  RUN TIME: 1103             [facility] [place], [place]   [address]



   Specimen Inquiry



   



   -----------------------------------------------------------------------------
---------------



   Name:  VALERIE GRACIA                : 1980    Attend Dr: Gianni Friedman MD



   Acct:  [id number]  Unit: [id number]  AGE: 33            Location:  [facility]



   Re13                        SEX: F             Status:    DEP ER



   -----------------------------------------------------------------------------
---------------



   



   SPEC: [id number]         LUIZ:       SUBM DR: Gianni Friedman MD



   REQ:  [id number]              RECD:   



   STATUS: RUTHIE MOREAU DR: Lissy Lr MD



   _



   SOURCE: URINE          SPDESC:



   ORDERED:  Urine Culture



   



   -----------------------------------------------------------------------------
---------------



   Procedure                         Result                         Verified   
        Site



   -----------------------------------------------------------------------------
---------------



   Urine Culture  Final                                             13-
1103      ML



   No Growth Day 2 (&lt;1,000 CFU/mL)



   



   -----------------------------------------------------------------------------
---------------



   



   



   



   



   



   



   



   



   



   



   



   



   



   



   



   



   



   



   



   



   



   



   



   



   



   



   



   



   



   ** END OF REPORT **



   



   * ML=Testing performed at Main Lab



   DEPARTMENT OF PATHOLOGY,  [facility] [place], [place] [address]



   Phone # [phone]      Fax #[phone]



   Андрей Jarquin M.D. Director     [place] Permit  #[id number]

 

 13  RUN DATE: 13               [facility] LAB **LIVE**       
         PAGE    1



 3  RUN TIME: 1445             [facility] [place], [place]   [address]



   Specimen Inquiry



   



   -----------------------------------------------------------------------------
---------------



   Name:  VALERIE GRACIA                : 1980    Attend Dr: Gianni Friedman MD



   Acct:  [id number]  Unit: [id number]  AGE: 33            Location:  [facility]



   Re13                        SEX: F             Status:    DEP ER



   -----------------------------------------------------------------------------
---------------



   



   SPEC: [id number]         LUIZ:       [facility] DR: Gianni Friedman MD



   REQ:  [id number]              RECD:   



   STATUS: RUTHIE MOREAU DR: Lissy Lr MD



   _



   SOURCE: VAGINAL        SPDESC:



   ORDERED:  Affirm



   



   -----------------------------------------------------------------------------
---------------



   Procedure                         Result                         Verified   
        Site



   -----------------------------------------------------------------------------
---------------



   Affirm Vaginal DNA Probe  Final                                  13-
1444      ML



   Trichomonas                 Positive



   Gardnerella                 Negative



   Candida                     Negative



   



   The presence of G. vaginalis, although suggestive, is not



   diagnostic for bacterial vaginosis. Results should be



   interpreted in conjunction with other clinical and



   laboratory data available.



   



   Women with vaginal discharge should be evaluated for risk



   factors of cervicitis and pelvic inflammatory disease, toxic



   shock syndrome (S.aureus), and if present, evaluated for



   organisms not included in this assay such as N. gonorrhoeae,



   C. trachomatis, Mobiluncus, Mycoplasma and/or Prevotella.



   Mixed infections may occur.



   



   The performance of this test on patient specimens collected



   during or immediately after antimicrobial therapy is



   unknown. The presence or absence of Candida species, G.



   vaginalis or T. vaginalis cannot be used as a test for



   therapeutic success or failure.



   



   -----------------------------------------------------------------------------
---------------



   



   



   



   



   



   



   



   



   



   ** END OF REPORT **



   



   * ML=Testing performed at Main Lab



   DEPARTMENT OF PATHOLOGY,  [address] [place] [address]



   Phone # [phone]      Fax #[phone]



   Андрей Jarquin M.D. Director     [place] Permit  #[id number]

 

 13  Test Performed by:



 4  [facility] Dpt of Lab Med and Pathology



   [address] [place] [id number]



   : Pillo Serra III, M.D.







Procedures







 Date  CPT Code  Description  Status

 

 2018  28930  Therapeutic,Prophylactic,Or Diagnostic Inj,SC/Im  Completed



     Specify Drug  

 

 2017  10053  Therapeutic,Prophylactic,Or Diagnostic Inj,SC/Im  Completed



     Specify Drug  

 

 2017  73270  Therapeutic,Prophylactic,Or Diagnostic Inj,SC/Im  Completed



     Specify Drug  

 

 2015  87774  Therapeutic,Prophylactic,Or Diagnostic Inj,SC/Im  Completed



     Specify Drug  

 

 2014  05207  Therapeutic,Prophylactic,Or Diagnostic Inj,SC/Im  Completed



     Specify Drug  

 

 2013  61586  Therapeutic,Prophylactic,Or Diagnostic Inj,SC/Im  Completed



     Specify Drug  







Encounters







 Type  Date  Location  Provider  CPT E/M  Dx

 

 Office Visit  2018  3:30p  Main Office  CHUCK Thompson-C  [address]  
M25.562









 G89.4

 

 K64.9

 

 E53.8









 Office Visit  03/15/2018  3:45p  Main Office  Ryan Dsouza FNP-C  [address]  
S46.811A









 G89.4

 

 R53.83









 Office Visit  2018  4:15p  Main Office  Ryan Dsouza FNP-C  53464  
G90.09

 

 Office Visit  2018  2:30p  Main Office  Ryan Dsouza FNP-C  26886  
G90.09









 G89.4

 

 G47.00









 Office Visit  2018  3:30p  Main Office  Ryan Dsouza FNP-C  30597  
G90.09









 G89.4









 Office Visit  01/15/2018  3:45p  Main Office  Ryan Dsouza FNP-C  [address]37  
G90.09









 G89.4









 Office Visit  2017 10:30a  Main Office  Ryan Dsouza FNP-C  [address]  
J06.9

 

 Office Visit  2017  3:00p  Main Office  Aayush Jackson III, FNP-C  [address]
  J06.9

 

 Office Visit  2017  4:15p  Main Office  Ryan Dsouza FNP-C  31396  
G90.09









 G89.4

 

 N76.0









 Office Visit  10/20/2017  2:30p  Main Office  Shawnti R. Meet, FNP-C  07179  
G89.4









 Z72.0









 Office Visit  09/15/2017  3:00p  Main Office  Shawnti R. Meet, FNP-C  47908  
G89.4









 S96.211A









 Office Visit  2017  2:30p  Main Office  Shawnti R. Meet, FNP-C  47752  
G89.4

 

 Office Visit  2017 10:45a  Main Office  Shawnti R. Meet, FNP-C  [address]27  
G89.4

 

 Office Visit  2017  4:00p  Main Office  Shawnti R. Storm, FNP-C  23578  
G89.4









 D51.9

 

 Z23









 Office Visit  2017  4:30p  Main Office  Shawnti R. Meet, FNP-C  18767  
G89.4









 H00.015









 Office Visit  2017  4:30p  Main Office  Shawnti R. Meet, FNP-C  [address]756  
J18.9









 G89.4

 

 D51.9

 

 F43.0

 

 E53.8









 Office Visit  2017  9:30a  Main Office  Shawnti R. Meet, FNP-C  [address]  
J18.9









 G89.4









 Office Visit  2017  8:45a  Main Office  Shawnti R. Meet, FNP-C  20461  
N76.0









 G89.4









 Office Visit  2016  4:30p  Main Office  Shawnti R. Meet, FNP-C  04115  
G90.09









 G89.4









 Office Visit  2016  4:45p  Main Office  Shawnti R. Storm, FNP-C  66560  
G90.09

 

 Office Visit  2016  3:30p  Main Office  Shawnti R. Storm, FNP-C  [address]77  
G90.09

 

 Office Visit  10/18/2016  3:45p  Main Office  Shawnti R. Storm, FNP-C  10597  
G90.09









 J18.9









 Office Visit  2016  4:00p  Main Office  Shawnti R. Storm, FNP-C  29975  
G90.09

 

 Office Visit  2016  3:00p  Main Office  Shawnti SHAWANDA Dsouza, FNP-C  64525  
G90.09

 

 Office Visit  2016  8:45a  Main Office  Aayush Jackson III, FNP-C  48207
  G90.09

 

 Office Visit  2016 10:30a  Main Office  Shawnti SHAWANDA Dsouza, FNP-C  05584  
G47.00









 R51









 Office Visit  2016 10:15a  Main Office  Shawnti UJSTIN. Meet, FNP-C  70433  
G90.09

 

 Office Visit  2016  3:45p  Main Office  Shawnti R. Meet, FNP-C  20328  
G90.09









 Z30.9

 

 R30.0









 Office Visit  2016  9:45a  Main Office  Shawnti JUSTIN. Meet, FNP-C  79586  
G90.09









 G47.00

 

 M79.1

 

 F43.9









 Office Visit  2016  4:00p  Main Office  Sergeiwnti SHAWANDA Dsouza, FNP-C  80160  
G90.09

 

 Office Visit  2016  2:45p  Main Office  Shawnti JUSTIN. Meet, FNP-C  49726  
G90.09









 M54.2

 

 G47.00









 Office Visit  2016  2:15p  Main Office  Shawnti R. Meet, FNP-C  87593  
M79.1









 G90.09









 Office Visit  2016  1:30p  Main Office  Sergeiwnti SHAWANDA Dsouza, FNP-C  14873  
Z00.00

 

 Office Visit  2016  3:30p  Main Office  Shawnti R. Meet, FNP-C  12325  
G90.09

 

 Office Visit  2015 11:30a  Main Office  Shawnti JUSTIN. Meet, FNP-C  39716  
K70.40









 D41.10









 Office Visit  2015  9:45a  Main Office  Jessy Elliott, FNP-C  15638  
K72.91

 

 Office Visit  2015  3:15p  Main Office  Shawnti JUSTIN. Meet, FNP-C  70298  
308.3

 

 Office Visit  2015  2:45p  Main Office  Shawnti R. Meet, FNP-C  24066  
303.90

 

 Office Visit  2015 10:45a  Main Office  ROSEANNA Thompson  72502  
303.90









 616.10

 

 281.1

 

 266.2









 Office Visit  2014  3:00p  Main Office  ROSEANNA Betts  06726  
789.9









 599.0









 Office Visit  2014  3:15p  Main Office  ROSEANNA Thompson  84382  
303.90









 266.2









 Office Visit  2013  8:30a  Main Office  Lissy Lr M.D.  19899
  V70.0









 728.85

 

 305.1

 

 616.10

 

 V04.81









 Office Visit  10/31/2013  2:00p  Main Office  Lissy Lr M.D.  00479
  728.85

 

 Office Visit  10/07/2013 11:00a  Main Office  Lissy Lr M.D.  97331
  V72.84









 078.12









 Office Visit  2013  3:30p  Main Office  Lissy Lr M.D.  42115
  787.91









 724.5









 Office Visit  2013  2:45p  Main Office  Lissy Lr M.D.  73853
  311









 728.9

 

 078.12









 Office Visit  2012 11:45a  Main Office  ROSEANNA Betts  [address]  311

 

 Office Visit  2012 11:45a  Main Office  ROSEANNA Betts  [address]13  311









 719.68









 Office Visit  2012 11:30a  Main Office  Lissy Lr M.D.  78771
  311









 780.52

 

 303.90

 

 477.9









 Office Visit  2012  9:00a  Main Office  Lissy Lr M.D.  83696
  303.90









 311

 

 780.52







Plan of Care

Future Appointment(s):2018  3:30 pm - ROSEANNA Thompson at Main 
[id number] - CHUCK Thompson-CM54.5 Low back painNew Therapy:
Physical Therapy-Evaluate And TreatComments:remains motivated to get off opiate 
pain medication. will refer to [facility] for assistance with this, interested in 
Naltrexone for pain management if needed once she is opiate free...will refer 
to pain clinic as well for non-opiate pain management. She works cleaning 
[employer] which has increased her painFollow up:referral to pain clinic for non 
opiate pain management  f/u 1 monthRecommendations:you can contact [facility] [phone] ..... they have a program to help people get off opiates.M25.559 Pain 
in unspecified hipComments:refer to PT for back and hip painD51.8 Other vitamin 
B12 deficiency anemiasComments:B12 injection today

## 2018-05-20 NOTE — UC
Arpita SERRATO Emily, scribed for Fely Paul DO on 05/20/18 at 1307 .





Throat Pain/Nasal Valerio HPI





- HPI Summary


HPI Summary: 


This patient is a 38 year old F presenting to [facility] care accompanied by 

son with a chief complaint of sore throat that began 2 days ago. Pt reports 

being able to swallow, but with extreme pain. The patient rates the pain 5/10 

in severity. Symptoms aggravated by nothing. Symptoms alleviated by nothing. 

Patient reports nausea, SOB, ear ache, and chills. Patient denies CP and 

diaphoresis. Patient reports starting to use decongestant nose spray daily four 

months ago.








- History of Current Complaint


Chief Complaint: UCGeneralIllness


Stated Complaint: THROAT PAIN


Time Seen by Provider: 05/20/18 12:58


Hx Obtained From: Patient


Hx Last Menstrual Period: bcp


Pregnant?: No


Onset/Duration: Sudden Onset, Lasting Days


Severity: Moderate


Pain Intensity: 5


Pain Scale Used: 0-10 Numeric





- Allergies/Home Medications


Allergies/Adverse Reactions: 


 Allergies











Allergy/AdvReac Type Severity Reaction Status Date / Time


 


codeine Allergy  Rash Verified 05/20/18 12:40














PMH/Surg Hx/FS Hx/Imm Hx


Previously Healthy: No


Cardiovascular History: Other


Other Cardiovascular History: Murmur


Psychological History: Anxiety





- Surgical History


Surgical History: None





- Family History


Known Family History: 


   Negative: Cardiac Disease, Hypertension, Diabetes





- Social History


Occupation: Employed Full-time


Lives: With Family


Alcohol Use: None


Alcohol Amount: SOBER SINCE NOV 2015, HX ALCOHOLISM


Substance Use Type: None


Substance Use Comment - Amount & Last Used: marijuana once a month


Smoking Status (MU): Current Every Day Smoker


Type: Cigarettes


Amount Used/How Often: 1/2 PPD


Length of Time of Smoking/Using Tobacco: 17 years


Have You Smoked in the Last Year: Yes





- Immunization History


Most Recent Influenza Vaccination: doesn't get flu shots





Review of Systems


Constitutional: Chills


Skin: Other - Negative diaphoresis


ENT: Sore Throat, Ear Ache


Respiratory: Shortness Of Breath


Cardiovascular: Other - Negative CP


Gastrointestinal: Nausea


All Other Systems Reviewed And Are Negative: Yes





Physical Exam





- Summary


Physical Exam Summary: 


Appearance: Well-Appearing, No Pain Distress, Well-Nourished


Eyes: conjunctiva clear, no discharge


ENT: Hearing grossly normal, no muffled/hoarse voice. TMs normal, tonsillar 

swelling, negative tonsillar exudate, negative trismus. 


Neck: Tender lymphadenopathy. No posterior lymphadenopathy.


Respiratory/Lung Sounds: Lungs clear, Normal breath sounds, No respiratory 

distress, No accessory muscle use


Cardiovascular: RRR, No murmur


Abdomen: Nontender, Soft, no guarding, not distended


Bowel Sounds: Present


Musculoskeletal: Normal 


Neurological: Alert, muscle tone normal 


Psychiatric: Normal, age appropriate behavior


Skin: Normal, Warm, Dry, Normal color





Triage Information Reviewed: Yes


Vital Signs: 


 Initial Vital Signs











Temp  96.5 F   05/20/18 12:36


 


Pulse  70   05/20/18 12:36


 


Resp  16   05/20/18 12:36


 


BP  106/63   05/20/18 12:36


 


Pulse Ox  100   05/20/18 12:36











Vital Signs Reviewed: Yes





Throat Pain/Nasal Course/Dx





- Course


Assessment/Plan: Patient will be discharged with prescription for magic mouth 

wash and prednisone with follow up from PCP. The patient is agreeable with this 

plan. Medications reviewed. Allergies reviewed





- Differential Dx/Diagnosis


Provider Diagnoses: Pharyngitis





Discharge





- Sign-Out/Discharge


Documenting (check all that apply): Discharge/Admit/Transfer





- Discharge Plan


Condition: Stable


Disposition: HOME


Prescriptions: 


Magic Mouth Was-ERNESTINA/MAAL/LIDO* 5 ml SWISH SPIT QID PRN #100 ml


 PRN Reason: Pain


predniSONE TAB* [Deltasone TAB*] 40 mg PO DAILY #10 tab


Patient Education Materials:  Strep Throat (ED)


Referrals: 


Ryan Dsouza, NP [Primary Care Provider] - If Needed


Additional Instructions: 


TRY USING THE NETTI POT IN THE MORNINGS AS DISCUSSED.  YOU MUST ALWAYS USE 

CLEAN WATER.





REMEMBER, POSTURE IS AN IMPORTANT FACTOR IN SINUS DRAINAGE.  MOVE YOUR NECK, 

BREATHE.





WE HAVE DONE SOME LAB WORK TO TEST FOR MONO.  YOU WILL BE CALLED WITH ABNORMAL 

RESULTS.  





AS DISCUSSED, TRY MAGIC MOUTHWASH TO CONTROL PAIN PRIOR TO EATING.











- Billing Disposition and Condition


Condition: STABLE


Disposition: HOME





The documentation as recorded by the Arpita keenan Emily accurately reflects the 

service I personally performed and the decisions made by me, Fely Paul DO.